# Patient Record
Sex: MALE | Race: WHITE | URBAN - METROPOLITAN AREA
[De-identification: names, ages, dates, MRNs, and addresses within clinical notes are randomized per-mention and may not be internally consistent; named-entity substitution may affect disease eponyms.]

---

## 2017-11-17 ENCOUNTER — APPOINTMENT (OUTPATIENT)
Dept: GENERAL RADIOLOGY | Facility: CLINIC | Age: 47
End: 2017-11-17
Attending: EMERGENCY MEDICINE
Payer: COMMERCIAL

## 2017-11-17 ENCOUNTER — HOSPITAL ENCOUNTER (EMERGENCY)
Facility: CLINIC | Age: 47
Discharge: HOME | End: 2017-11-17
Attending: EMERGENCY MEDICINE | Admitting: EMERGENCY MEDICINE
Payer: COMMERCIAL

## 2017-11-17 ENCOUNTER — OFFICE VISIT (OUTPATIENT)
Dept: URGENT CARE | Facility: URGENT CARE | Age: 47
End: 2017-11-17
Payer: COMMERCIAL

## 2017-11-17 VITALS
WEIGHT: 180 LBS | HEIGHT: 73 IN | SYSTOLIC BLOOD PRESSURE: 107 MMHG | HEART RATE: 56 BPM | TEMPERATURE: 98.7 F | DIASTOLIC BLOOD PRESSURE: 75 MMHG | RESPIRATION RATE: 16 BRPM | OXYGEN SATURATION: 98 % | BODY MASS INDEX: 23.86 KG/M2

## 2017-11-17 VITALS
OXYGEN SATURATION: 96 % | SYSTOLIC BLOOD PRESSURE: 111 MMHG | WEIGHT: 180.3 LBS | DIASTOLIC BLOOD PRESSURE: 74 MMHG | RESPIRATION RATE: 16 BRPM | TEMPERATURE: 97.5 F | HEART RATE: 64 BPM

## 2017-11-17 DIAGNOSIS — R07.89 ATYPICAL CHEST PAIN: ICD-10-CM

## 2017-11-17 DIAGNOSIS — R07.9 ACUTE CHEST PAIN: Primary | ICD-10-CM

## 2017-11-17 LAB — TROPONIN I SERPL-MCNC: <0.015 UG/L (ref 0–0.04)

## 2017-11-17 PROCEDURE — 84484 ASSAY OF TROPONIN QUANT: CPT | Performed by: EMERGENCY MEDICINE

## 2017-11-17 PROCEDURE — 99214 OFFICE O/P EST MOD 30 MIN: CPT | Mod: 25 | Performed by: PHYSICIAN ASSISTANT

## 2017-11-17 PROCEDURE — 93000 ELECTROCARDIOGRAM COMPLETE: CPT | Performed by: PHYSICIAN ASSISTANT

## 2017-11-17 PROCEDURE — 96372 THER/PROPH/DIAG INJ SC/IM: CPT | Performed by: PHYSICIAN ASSISTANT

## 2017-11-17 PROCEDURE — 71020 XR CHEST 2 VW: CPT

## 2017-11-17 PROCEDURE — 93005 ELECTROCARDIOGRAM TRACING: CPT

## 2017-11-17 PROCEDURE — 99285 EMERGENCY DEPT VISIT HI MDM: CPT | Mod: 25

## 2017-11-17 RX ORDER — KETOROLAC TROMETHAMINE 30 MG/ML
30 INJECTION, SOLUTION INTRAMUSCULAR; INTRAVENOUS ONCE
Qty: 1 ML | Refills: 0 | OUTPATIENT
Start: 2017-11-17 | End: 2017-11-17

## 2017-11-17 ASSESSMENT — ENCOUNTER SYMPTOMS
WEAKNESS: 0
MYALGIAS: 1
VOMITING: 0
FEVER: 0
NAUSEA: 0
DIAPHORESIS: 1
NUMBNESS: 0
SHORTNESS OF BREATH: 0
COUGH: 0

## 2017-11-17 NOTE — ED AVS SNAPSHOT
Emergency Department    [address] [facility] [address]    Phone:  [phone]    Fax:  [phone]                                       Taj Lundy   MRN: [id number]    Department:   Emergency Department   Date of Visit:  11/17/2017           Patient Information     Date Of Birth          1970        Your diagnoses for this visit were:     Atypical chest pain        You were seen by Mago Ring MD.      Follow-up Information     Follow up with Follow-up with your primary care doctor for further outpatient work-up.        Follow up with  Emergency Department.    Specialty:  EMERGENCY MEDICINE    Why:  As needed, If symptoms worsen    Contact information:    [address] [facility] [address] [phone]        Discharge Instructions          *CHEST PAIN, UNCERTAIN CAUSE    Based on your exam today, the exact cause of your chest pain is not certain. Your condition does not seem serious at this time, and your pain does not appear to be coming from your heart. However, sometimes the signs of a serious problem take more time to appear. Therefore, watch for the warning signs listed below.  HOME CARE:  1. Rest today and avoid strenuous activity.  2. Take any prescribed medicine as directed.  FOLLOW UP with your doctor in 1-3 days.   GET PROMPT MEDICAL ATTENTION if any of the following occur:    A change in the type of pain: if it feels different, becomes more severe, lasts longer, or begins to spread into your shoulder, arm, neck, jaw or back    Shortness of breath or increased pain with breathing    Weakness, dizziness, or fainting    Cough with blood or dark colored sputum (phlegm)    Fever over 101  F (38.3  C)    Swelling, pain or redness in one leg    5201-8263 [employer]. [address] [place] [address]. All rights reserved. This information is not intended as a substitute for professional medical care. Always follow  your healthcare professional's instructions.  This information has been modified by your health care provider with permission from the publisher.      24 Hour Appointment Hotline       To make an appointment at any [facility], call [phone] ([phone]). If you don't have a family doctor or clinic, we will help you find one. Midnight clinics are conveniently located to serve the needs of you and your family.             Review of your medicines      Our records show that you are taking the medicines listed below. If these are incorrect, please call your family doctor or clinic.        Dose / Directions Last dose taken    aspirin  MG EC tablet   Dose:  325 mg   Quantity:  1 tablet        Take 1 tablet (325 mg) by mouth once for 1 dose   Refills:  0        azithromycin 250 MG tablet   Commonly known as:  ZITHROMAX   Quantity:  6 tablet        2 tabs po qd day 1, then 1 tab po qd days 2-5   Refills:  0        benzonatate 200 MG capsule   Commonly known as:  TESSALON   Dose:  200 mg   Quantity:  21 capsule        Take 1 capsule (200 mg) by mouth 3 times daily as needed for cough   Refills:  0        cholecalciferol 5000 UNITS Caps capsule   Commonly known as:  vitamin D3   Dose:  1 capsule        Take 1 capsule by mouth daily   Refills:  0        cyclobenzaprine 10 MG tablet   Commonly known as:  FLEXERIL   Dose:  10 mg   Quantity:  30 tablet        Take 1 tablet (10 mg) by mouth 3 times daily as needed for muscle spasms   Refills:  0        ketorolac 30 MG/ML injection   Commonly known as:  TORADOL   Dose:  30 mg   Quantity:  1 mL        Inject 1 mL (30 mg) into the muscle once for 1 dose   Refills:  0        NO ACTIVE MEDICATIONS        Refills:  0        TYLENOL 325 MG tablet   Dose:  325-650 mg   Generic drug:  acetaminophen        Take 325-650 mg by mouth every 6 hours as needed for mild pain   Refills:  0                Procedures and tests performed during your visit     EKG 12 lead     Troponin I (now)    XR Chest 2 Views      Orders Needing Specimen Collection     None      Pending Results     Date and Time Order Name Status Description    11/17/2017 2044 XR Chest 2 Views Preliminary     11/17/2017 1841 EKG 12-LEAD COMPLETE W/READ - CLINICS Preliminary             Pending Culture Results     No orders found from 11/15/2017 to 11/18/2017.            Pending Results Instructions     If you had any lab results that were not finalized at the time of your Discharge, you can call the ED Lab Result RN at [phone]. You will be contacted by this team for any positive Lab results or changes in treatment. The nurses are available 7 days a week from 10A to 6:30P.  You can leave a message 24 hours per day and they will return your call.        Test Results From Your Hospital Stay        11/17/2017  9:12 PM      Component Results     Component Value Ref Range & Units Status    Troponin I ES <0.015 0.000 - 0.045 ug/L Final    The 99th percentile for upper reference range is 0.045 ug/L.  Troponin values   in the range of 0.045 - 0.120 ug/L may be associated with risks of adverse   clinical events.           11/17/2017  8:57 PM      Narrative     CHEST TWO VIEWS  11/17/2017  8:52 PM     COMPARISON: Two view chest x-ray 3/2/2013.    HISTORY: Chest pain, left-sided.    FINDINGS: The cardiac silhouette, pulmonary vasculature, lungs and  pleural spaces are within normal limits.        Impression     IMPRESSION: Clear lungs.                Clinical Quality Measure: Blood Pressure Screening     Your blood pressure was checked while you were in the emergency department today. The last reading we obtained was  BP: 127/76 . Please read the guidelines below about what these numbers mean and what you should do about them.  If your systolic blood pressure (the top number) is less than 120 and your diastolic blood pressure (the bottom number) is less than 80, then your blood pressure is normal. There is nothing more that  "you need to do about it.  If your systolic blood pressure (the top number) is 120-139 or your diastolic blood pressure (the bottom number) is 80-89, your blood pressure may be higher than it should be. You should have your blood pressure rechecked within a year by a primary care provider.  If your systolic blood pressure (the top number) is 140 or greater or your diastolic blood pressure (the bottom number) is 90 or greater, you may have high blood pressure. High blood pressure is treatable, but if left untreated over time it can put you at risk for heart attack, stroke, or kidney failure. You should have your blood pressure rechecked by a primary care provider within the next 4 weeks.  If your provider in the emergency department today gave you specific instructions to follow-up with your doctor or provider even sooner than that, you should follow that instruction and not wait for up to 4 weeks for your follow-up visit.        Thank you for choosing [facility]       Thank you for choosing [facility] for your care. Our goal is always to provide you with excellent care. Hearing back from our patients is one way we can continue to improve our services. Please take a few minutes to complete the written survey that you may receive in the mail after you visit with us. Thank you!        [employer] Information     [employer] lets you send messages to your doctor, view your test results, renew your prescriptions, schedule appointments and more. To sign up, go to [email or web address] . Click on \"Log in\" on the left side of the screen, which will take you to the Welcome page. Then click on \"Sign up Now\" on the right side of the page.     You will be asked to enter the access code listed below, as well as some personal information. Please follow the directions to create your username and password.     Your access code is: [email or web address]  Expires: 2/15/2018  8:13 PM     Your access code will  in 90 days. If you need help or a new " code, please call your [place] clinic or [phone].        Care EveryWhere ID     This is your Care EveryWhere ID. This could be used by other organizations to access your [facility] medical records  [id number]        Equal Access to Services     ALTA PELAYO : Trina Durbin, watanikada luqadaha, qaybta kaalmada rosie, joni cordero. So [facility] [phone].    ATENCIÓN: Si habla español, tiene a vogel disposición servicios gratuitos de asistencia lingüística. Llame al [phone].    We comply with applicable federal civil rights laws and [place] laws. We do not discriminate on the basis of race, color, national origin, age, disability, sex, sexual orientation, or gender identity.            After Visit Summary       This is your record. Keep this with you and show to your community pharmacist(s) and doctor(s) at your next visit.

## 2017-11-17 NOTE — ED AVS SNAPSHOT
Emergency Department    [address] [place] [address]    Phone:  [phone]    Fax:  [phone]                                       Taj Lundy   MRN: [id number]    Department:   Emergency Department   Date of Visit:  11/17/2017           After Visit Summary Signature Page     I have received my discharge instructions, and my questions have been answered. I have discussed any challenges I see with this plan with the nurse or doctor.    ..........................................................................................................................................  Patient/Patient Representative Signature      ..........................................................................................................................................  Patient Representative Print Name and Relationship to Patient    ..................................................               ................................................  Date                                            Time    ..........................................................................................................................................  Reviewed by Signature/Title    ...................................................              ..............................................  Date                                                            Time

## 2017-11-17 NOTE — MR AVS SNAPSHOT
"              After Visit Summary   2017    Taj Lundy    MRN: [id number]           Patient Information     Date Of Birth          1970        Visit Information        Provider Department      2017 6:20 PM Elmer Rawls PA-C [facility]        Today's Diagnoses     Acute chest pain    -  1       Follow-ups after your visit        Who to contact     If you have questions or need follow up information about today's clinic visit or your schedule please contact [facility] [place] [facility] directly at [phone].  Normal or non-critical lab and imaging results will be communicated to you by [employer], letter or phone within 4 business days after the clinic has received the results. If you do not hear from us within 7 days, please contact the clinic through [employer] or phone. If you have a critical or abnormal lab result, we will notify you by phone as soon as possible.  Submit refill requests through [employer] or call your pharmacy and they will forward the refill request to us. Please allow 3 business days for your refill to be completed.          Additional Information About Your Visit        MyChart Information     [employer] lets you send messages to your doctor, view your test results, renew your prescriptions, schedule appointments and more. To sign up, go to [email or web address] . Click on \"Log in\" on the left side of the screen, which will take you to the Welcome page. Then click on \"Sign up Now\" on the right side of the page.     You will be asked to enter the access code listed below, as well as some personal information. Please follow the directions to create your username and password.     Your access code is: [email or web address]  Expires: 2/15/2018  8:13 PM     Your access code will  in 90 days. If you need help or a new code, please call your [place] clinic or [phone].        Care EveryWhere ID     This is your Care EveryWhere ID. " This could be used by other organizations to access your [facility] medical records  [id number]        Your Vitals Were     Pulse Temperature Respirations Pulse Oximetry          64 97.5  F (36.4  C) (Oral) 16 96%         Blood Pressure from Last 3 Encounters:   11/17/17 111/74   11/17/17 127/76   05/31/15 110/60    Weight from Last 3 Encounters:   11/17/17 180 lb 4.8 oz (81.8 kg)   11/17/17 180 lb (81.6 kg)   05/31/15 171 lb 14.4 oz (78 kg)              We Performed the Following     EKG 12-lead complete w/read - Clinics     INJECTION INTRAMUSCULAR OR SUB-Q     KETOROLAC TROMETHAMINE 15MG          Today's Medication Changes          These changes are accurate as of: 11/17/17  8:13 PM.  If you have any questions, ask your nurse or doctor.               Start taking these medicines.        Dose/Directions    aspirin  MG EC tablet   Used for:  Acute chest pain   Started by:  Elmer Rawls PA-C        Dose:  325 mg   Take 1 tablet (325 mg) by mouth once for 1 dose   Quantity:  1 tablet   Refills:  0       ketorolac 30 MG/ML injection   Commonly known as:  TORADOL   Used for:  Acute chest pain   Started by:  Elmer Rawls PA-C        Dose:  30 mg   Inject 1 mL (30 mg) into the muscle once for 1 dose   Quantity:  1 mL   Refills:  0            Where to get your medicines      These medications were sent to [facility] [address] [address]     Phone:  [phone]     aspirin  MG EC tablet         Some of these will need a paper prescription and others can be bought over the counter.  Ask your nurse if you have questions.     You don't need a prescription for these medications     ketorolac 30 MG/ML injection                Primary Care Provider    Physician No Ref-Primary       NO REF-PRIMARY PHYSICIAN        Equal Access to Services     ALTA PELAYO : Trina Durbin, bethel golden, kayy owens  joni velezsamantha castroaan ah. So [facility] [phone].    ATENCIÓN: Si altala lela, tiene a vogel disposición servicios gratuitos de asistencia lingüística. Zayra al [phone].    We comply with applicable federal civil rights laws and [place] laws. We do not discriminate on the basis of race, color, national origin, age, disability, sex, sexual orientation, or gender identity.            Thank you!     Thank you for choosing [facility]  for your care. Our goal is always to provide you with excellent care. Hearing back from our patients is one way we can continue to improve our services. Please take a few minutes to complete the written survey that you may receive in the mail after your visit with us. Thank you!             Your Updated Medication List - Protect others around you: Learn how to safely use, store and throw away your medicines at www.disposemymeds.org.          This list is accurate as of: 11/17/17  8:13 PM.  Always use your most recent med list.                   Brand Name Dispense Instructions for use Diagnosis    aspirin  MG EC tablet     1 tablet    Take 1 tablet (325 mg) by mouth once for 1 dose    Acute chest pain       azithromycin 250 MG tablet    ZITHROMAX    6 tablet    2 tabs po qd day 1, then 1 tab po qd days 2-5    Productive cough, Purulent rhinitis, Chest congestion       benzonatate 200 MG capsule    TESSALON    21 capsule    Take 1 capsule (200 mg) by mouth 3 times daily as needed for cough    Coughing       cholecalciferol 5000 UNITS Caps capsule    vitamin D3     Take 1 capsule by mouth daily        cyclobenzaprine 10 MG tablet    FLEXERIL    30 tablet    Take 1 tablet (10 mg) by mouth 3 times daily as needed for muscle spasms    LBP (low back pain)       ketorolac 30 MG/ML injection    TORADOL    1 mL    Inject 1 mL (30 mg) into the muscle once for 1 dose    Acute chest pain       NO ACTIVE MEDICATIONS           TYLENOL  325 MG tablet   Generic drug:  acetaminophen      Take 325-650 mg by mouth every 6 hours as needed for mild pain

## 2017-11-18 LAB — INTERPRETATION ECG - MUSE: NORMAL

## 2017-11-18 NOTE — NURSING NOTE
Chief Complaint   Patient presents with     Arm Pain     Pain under lt arm pit x about 2 months. Pt states that he got accupuncture x 2 days. Pt complains of lt side chest pain and arm pain since then x 2 days        Initial /74  Pulse 64  Temp 97.5  F (36.4  C) (Oral)  Resp 16  Wt 180 lb 4.8 oz (81.8 kg)  SpO2 96% There is no height or weight on file to calculate BMI.  Medication Reconciliation: complete

## 2017-11-18 NOTE — ED PROVIDER NOTES
"  History     Chief Complaint:  Chest pain      HPI   Taj Lundy is a 47 year old male who presents with chest pain. The patient states he has been experiencing ongoing \"dull, cramping in the left armpit\" for the past few months which he has tried acupuncture to help with pain. This pain is radiating into his left shoulder and down his left arm today which prompted him to go to Urgent Care and he was sent here for further evaluation. Here, he notes that his pain improves if his rests his left arm against his body and worsens when he leaves the left arm hanging. In addition he also notes mild sweating and warmth today. Here, the complains of continued dull pain. However, he denies fever, cough, numbness, weakness, shortness of breath, nausea, vomiting, or any additional symptoms.      CARDIAC RISK FACTORS:  Sex:    Male  Tobacco:   Former smoker  Hypertension:   No  Hyperlipidemia:  No  Diabetes:   No  Family History:  No    Allergies:  Amoxicillin     Medications:    ketorolac (TORADOL) 30 MG/ML injection  aspirin  MG EC tablet  acetaminophen (TYLENOL) 325 MG tablet  cyclobenzaprine (FLEXERIL) 10 MG tablet  azithromycin (ZITHROMAX) 250 MG tablet  benzonatate (TESSALON) 200 MG capsule  cholecalciferol (VITAMIN D3) 5000 UNITS CAPS capsule    Past Medical History:    The patient does not have any past pertinent medical history.    Past Surgical History:    History reviewed. No pertinent surgical history.     Family History:    History reviewed. No pertinent family history.      Social History:  Smoking status: Former smoker  Alcohol use: Yes     Review of Systems   Constitutional: Positive for diaphoresis. Negative for fever.   Respiratory: Negative for cough and shortness of breath.    Cardiovascular: Positive for chest pain.   Gastrointestinal: Negative for nausea and vomiting.   Musculoskeletal: Positive for myalgias.   Neurological: Negative for weakness and numbness.   All other systems reviewed and are " "negative.    Physical Exam   First Vitals:  BP: 127/76  Pulse: 61  Temp: 98.7  F (37.1  C)  Resp: 16  Height: 185.4 cm (6' 1\")  Weight: 81.6 kg (180 lb)  SpO2: 99 %    Physical Exam  General/Appearance: appears stated age, well-groomed, appears comfortable  Eyes: EOMI, no scleral injection, no icterus  ENT: MMM  Neck: supple, nl ROM, no stiffness  Cardiovascular: RRR, nl S1S2, no m/r/g, 2+ pulses in all 4 extremities, cap refill <2sec  Respiratory: CTAB, good air movement throughout, no wheezes/rhonchi/rales, no increased WOB, no retractions  Back: no lesions, no reproducible pain with palp to shoulder/upper back, + muscle spasms appreciated in L lateral upper back  GI: abd soft, non-distended, nttp,  no HSM, no rebound, no guarding, nl BS  MSK: MOOENY, good tone, no bony abnormality, no reproducible ttp to LUE  Skin: warm and well-perfused, no rash, no edema, no ecchymosis, nl turgor  Neuro: GCS 15, alert and oriented, no gross focal neuro deficits  Psych: interacts appropriately  Heme: no petechia, no purpura, no active bleeding        Emergency Department Course   ECG (20:07:56):  Rate 60 bpm. DC interval 146. QRS duration 96. QT/QTc 388/388. P-R-T axes 81 116 59. Normal sinus rhythm. Right axis deviation. Pulmonary disease pattern. Abnormal ECG. Interpreted at 2035 by Mago Ring MD.     Imaging:  Radiographic findings were communicated with the patient who voiced understanding of the findings.    X-ray chest, 2 views:  Clear lungs  Result per radiology.      Laboratory:  2040 Troponin: <0.015    Emergency Department Course:  Past medical records, nursing notes, and vitals reviewed.  2029: I performed an exam of the patient and obtained history, as documented above.  IV started and labs were obtained.   The patient was sent for an X-ray while in the emergency department, findings above.    2131: I rechecked the patient. Findings and plan explained to the Patient. Patient discharged home with instructions " "regarding supportive care, medications, and reasons to return. The importance of close follow-up was reviewed.       Impression & Plan    Medical Decision Making:  This patient is a 47-year-old male with several months of intermittent \"cramping and spasmodic\" discomfort to his left axilla that over the past 1-2 days he began also experiencing in his proximal left upper extremity and just lateral to his left shoulder blade. It never appears to be reproducible. He denies any concerning symptoms with this such as pleuritic nature, exertional nature, shortness of breath, diaphoresis, vomiting. He has no early familial cardiac disease nor personal risk factors for cardiac disease. EKG and troponin were unremarkable. Given the longevity of this pain and the atypical nature I have very low suspicion that it represents ACS, PE, aortic dissection. Pulmonary exam on chest x-ray did not show any evidence of pneumothorax or other masses. I think this is reasonable to have further follow-up as an outpatient.    Diagnosis:    ICD-10-CM    1. Atypical chest pain R07.89        Disposition:  discharged to home    Tye Escoto  11/17/2017    EMERGENCY DEPARTMENT  I, Tye Escoto, am serving as a scribe at 8:29 PM on 11/17/2017 to document services personally performed by Mago Ring MD based on my observations and the provider's statements to me.       Mago Ring MD  11/17/17 2224    "

## 2017-11-18 NOTE — DISCHARGE INSTRUCTIONS
*CHEST PAIN, UNCERTAIN CAUSE    Based on your exam today, the exact cause of your chest pain is not certain. Your condition does not seem serious at this time, and your pain does not appear to be coming from your heart. However, sometimes the signs of a serious problem take more time to appear. Therefore, watch for the warning signs listed below.  HOME CARE:  1. Rest today and avoid strenuous activity.  2. Take any prescribed medicine as directed.  FOLLOW UP with your doctor in 1-3 days.   GET PROMPT MEDICAL ATTENTION if any of the following occur:    A change in the type of pain: if it feels different, becomes more severe, lasts longer, or begins to spread into your shoulder, arm, neck, jaw or back    Shortness of breath or increased pain with breathing    Weakness, dizziness, or fainting    Cough with blood or dark colored sputum (phlegm)    Fever over 101  F (38.3  C)    Swelling, pain or redness in one leg    2944-7063 [employer]. [address] [place] [address]. All rights reserved. This information is not intended as a substitute for professional medical care. Always follow your healthcare professional's instructions.  This information has been modified by your health care provider with permission from the publisher.

## 2017-11-18 NOTE — PROGRESS NOTES
SUBJECTIVE:    Taj Lundy is an otherwise healthy 47 year old male with limited medical history who presents to the office with the CC of chest pain.     Patient complains of left sided lateral chest pain - radiating to/from the back.  The pain is characterized as 8-9 out of 10, localized and intermittent stopping him in his tracks with each occurrance lasting up to 10 seconds.   He states that symptoms began 2 month(s) ago, gradual onset but over the last two days the pain has been worsening and occurring more frequently.   He believes this may be due to acupuncture treatment he received the day before.       Patient feels it is musculoskeletal in nature, but cannot pinpoint or recreate the pain.      Denies shortness of breath, dyspnea on exertion, or exertional pain.     No history of heart disease, hyperlipidemia, hypertension.  Patient does not have a regular doctor, and states that [facility] is the only healthcare he has seen in the last 10 years.       No past medical history on file.      Current Outpatient Prescriptions:      ketorolac (TORADOL) 30 MG/ML injection, Inject 1 mL (30 mg) into the muscle once for 1 dose, Disp: 1 mL, Rfl: 0     aspirin  MG EC tablet, Take 1 tablet (325 mg) by mouth once for 1 dose, Disp: 1 tablet, Rfl: 0     acetaminophen (TYLENOL) 325 MG tablet, Take 325-650 mg by mouth every 6 hours as needed for mild pain, Disp: , Rfl:      cyclobenzaprine (FLEXERIL) 10 MG tablet, Take 1 tablet (10 mg) by mouth 3 times daily as needed for muscle spasms (Patient not taking: Reported on 11/17/2017), Disp: 30 tablet, Rfl: 0     azithromycin (ZITHROMAX) 250 MG tablet, 2 tabs po qd day 1, then 1 tab po qd days 2-5 (Patient not taking: Reported on 11/17/2017), Disp: 6 tablet, Rfl: 0     benzonatate (TESSALON) 200 MG capsule, Take 1 capsule (200 mg) by mouth 3 times daily as needed for cough (Patient not taking: Reported on 11/17/2017), Disp: 21 capsule, Rfl: 0      cholecalciferol (VITAMIN D3) 5000 UNITS CAPS capsule, Take 1 capsule by mouth daily, Disp: , Rfl:      NO ACTIVE MEDICATIONS, , Disp: , Rfl:     Social History   Substance Use Topics     Smoking status: Former Smoker     Types: Cigarettes     Quit date: 1/8/2003     Smokeless tobacco: Never Used     Alcohol use Yes       ROS:Review of systems negative except as stated above.    EXAM:  /74  Pulse 64  Temp 97.5  F (36.4  C) (Oral)  Resp 16  Wt 180 lb 4.8 oz (81.8 kg)  SpO2 96%   GENERAL APPEARANCE: healthy, drowsy, marked distress from episodes of pain.   RESP: lungs clear to auscultation  CV: regular rates and rhythm  MS: Unable to reproduce pain with positions, palpation; non-tender  NEURO: Normal strength and tone, sensory exam grossly normal,  normal speech and mentation  SKIN: minimal diaphoresis    Office EKG demonstrates:  sinus bradycardia, rightward axis    IMPRESSION  (R07.9) Acute chest pain  (primary encounter diagnosis)  Comment:  Atypical chest pain.  Patient given Aspirin and Ketorolac in clinic without improvement. Physical exam and ekg inconclusive.  Concern for cardiac etiology cannot be ruled out.      Plan: EKG 12-lead complete w/read - Clinics,         ketorolac (TORADOL) 30 MG/ML injection, aspirin         MG EC tablet, KETOROLAC TROMETHAMINE         15MG, INJECTION INTRAMUSCULAR OR SUB-Q,     Patient directed to [facility] ER by ambulance, opted to have his brother drive him to the ER.

## 2017-12-23 ENCOUNTER — HOSPITAL ENCOUNTER (EMERGENCY)
Facility: HOSPITAL | Age: 47
Discharge: HOME | End: 2017-12-23
Payer: COMMERCIAL

## 2017-12-23 ENCOUNTER — APPOINTMENT (OUTPATIENT)
Dept: RADIOLOGY | Facility: HOSPITAL | Age: 47
End: 2017-12-23
Payer: COMMERCIAL

## 2017-12-23 VITALS
WEIGHT: 188.93 LBS | HEIGHT: 73 IN | HEART RATE: 58 BPM | OXYGEN SATURATION: 96 % | BODY MASS INDEX: 25.04 KG/M2 | DIASTOLIC BLOOD PRESSURE: 66 MMHG | RESPIRATION RATE: 16 BRPM | TEMPERATURE: 97.3 F | SYSTOLIC BLOOD PRESSURE: 106 MMHG

## 2017-12-23 DIAGNOSIS — M54.2 CERVICALGIA: Primary | ICD-10-CM

## 2017-12-23 PROCEDURE — 72040 X-RAY EXAM NECK SPINE 2-3 VW: CPT

## 2017-12-23 PROCEDURE — 99283 EMERGENCY DEPT VISIT LOW MDM: CPT

## 2017-12-23 PROCEDURE — 2500000200 HC RX 250 WO HCPCS: Performed by: PHYSICIAN ASSISTANT

## 2017-12-23 PROCEDURE — 6370000100 HC RX 637 (ALT 250 FOR IP): Performed by: PHYSICIAN ASSISTANT

## 2017-12-23 PROCEDURE — 72050 X-RAY EXAM NECK SPINE 4/5VWS: CPT

## 2017-12-23 RX ORDER — HYDROCODONE BITARTRATE AND ACETAMINOPHEN 5; 325 MG/1; MG/1
1 TABLET ORAL ONCE
Status: COMPLETED | OUTPATIENT
Start: 2017-12-23 | End: 2017-12-23

## 2017-12-23 RX ORDER — ONDANSETRON 4 MG/1
4 TABLET, ORALLY DISINTEGRATING ORAL ONCE
Status: COMPLETED | OUTPATIENT
Start: 2017-12-23 | End: 2017-12-23

## 2017-12-23 RX ORDER — HYDROCODONE BITARTRATE AND ACETAMINOPHEN 5; 325 MG/1; MG/1
1 TABLET ORAL EVERY 6 HOURS PRN
Qty: 12 TABLET | Refills: 0 | Status: SHIPPED | OUTPATIENT
Start: 2017-12-23 | End: 2017-12-26

## 2017-12-23 RX ADMIN — HYDROCODONE BITARTRATE AND ACETAMINOPHEN 1 TABLET: 5; 325 TABLET ORAL at 12:51

## 2017-12-23 RX ADMIN — ONDANSETRON 4 MG: 4 TABLET, ORALLY DISINTEGRATING ORAL at 12:52

## 2017-12-23 NOTE — ED PROVIDER NOTES
HPI:  Chief Complaint   Patient presents with   • Neck Pain     PT REPORTS PINCHED NERVE IN NECK AND UNABLE TO GET IN WITH DOCTOR BUT CANT MANAGE PAIN. PT SEEN AT URGENT CARE TODAY AND GIVEN 60MG TORADOL, BUT NO IMPROVEMENT       Renaldo is a 47-year-old male who presents to be evaluated for flareup of chronic back, muscle, joint pain today.  He reports this is something that is been going on for months.  He was sent here from the recommendation of urgent care today after they had seen him and give him a dose of Toradol.  Additionally, he has a prescription for cyclobenzaprine, tramadol, and ibuprofen that he has been taking.  He reports nothing helps that it makes the pain better.  He reports today the pain is more severe than he feels it has been.  He denies any chest pain or shortness of breath.  No headaches or vision change.  No weakness.  No swelling or edema.  Denies any fever or chills.  Denies any unintentional weight changes over the past several months.  Denies any rashes or lesions.  Denies any medication changes other than those previously mentioned.  He reports he has had imaging, to include an MRI done in the past.  He was directed by another ER to follow-up with primary care which he did not do.            HISTORY:  Past Medical History:   Diagnosis Date   • Chronic pain     NECK       Past Surgical History:   Procedure Laterality Date   • APPENDECTOMY         History reviewed. No pertinent family history.    Social History   Substance Use Topics   • Smoking status: Never Smoker   • Smokeless tobacco: Never Used   • Alcohol use Yes      Comment: 5X WEEKLY       ROS:  Review of Systems   All other systems reviewed and are negative.      PE:  ED Triage Vitals   Temp Heart Rate Resp BP SpO2   12/23/17 1148 12/23/17 1148 12/23/17 1148 12/23/17 1148 12/23/17 1148   36.3 °C (97.3 °F) 65 16 97/69 95 %      Temp Source Heart Rate Source Patient Position BP Location FiO2 (%)   12/23/17 1148 -- 12/23/17 1325  -- --   Oral  Sitting         Physical Exam   Constitutional: He appears well-developed and well-nourished. No distress.   HENT:   Head: Normocephalic and atraumatic.   Eyes: Conjunctivae are normal.   Neck: Neck supple.   Cardiovascular: Normal rate and regular rhythm.    No murmur heard.  Pulmonary/Chest: Effort normal and breath sounds normal. No respiratory distress.   Abdominal: Soft. There is no tenderness.   Musculoskeletal: He exhibits no edema.        Cervical back: He exhibits pain. He exhibits normal range of motion, no tenderness, no bony tenderness, no swelling, no edema and no spasm.   Neurological: He is alert. He has normal strength. He displays normal reflexes. No cranial nerve deficit or sensory deficit. GCS eye subscore is 4. GCS verbal subscore is 5. GCS motor subscore is 6.   Reflex Scores:       Patellar reflexes are 2+ on the right side and 2+ on the left side.  Pain does seem out of proportion to physical exam findings.   Skin: Skin is warm and dry. He is not diaphoretic.   Psychiatric: He has a normal mood and affect. His speech is normal and behavior is normal. Thought content normal.   Nursing note and vitals reviewed.      ED LABS:  Labs Reviewed - No data to display      ED IMAGES:  X-ray cervical spine complete 4 or 5 views   Final Result   Degenerative changes at C5-6 with disc space narrowing and endplate degenerative changes and reversal of usual lordotic curvature.          ED PROCEDURES:  Procedures    ED COURSE:  ED Course        MDM:  MDM  Number of Diagnoses or Management Options  Cervicalgia:   Diagnosis management comments: Discussed x-ray findings with patient.  Overall his physical exam is reassuring.  His subjective complaint of pain being consistent with a chronic issue does need additional workup I feel.  I encouraged the patient that this workup to follow with family medicine/primary care as it is not an acute/emergency issue.  I feel he would best be served by a primary  care provider who can review his medical records and proceed with a systematic workup with close follow-up and continuity of care.  I discussed a safe and effective use of the prescribed medications and encourage patient to continue to rest and avoid aggravating activities.  Discussed safe and effective over-the-counter anti-inflammatory and analgesic medications.  Recommend he return right away for any new or worsening symptoms or if no improvement.  Patient verbalizes understanding and agreement to the plan of care.       Amount and/or Complexity of Data Reviewed  Tests in the radiology section of CPT®: reviewed and ordered  Tests in the medicine section of CPT®: reviewed and ordered        Final diagnoses:   [M54.2] Cervicalgia        TERESA Olivas  12/23/17 5313

## 2017-12-24 ENCOUNTER — HOSPITAL ENCOUNTER (EMERGENCY)
Facility: HOSPITAL | Age: 47
Discharge: HOME | End: 2017-12-24
Payer: COMMERCIAL

## 2017-12-24 VITALS
RESPIRATION RATE: 16 BRPM | WEIGHT: 185.19 LBS | DIASTOLIC BLOOD PRESSURE: 62 MMHG | HEART RATE: 113 BPM | BODY MASS INDEX: 24.43 KG/M2 | SYSTOLIC BLOOD PRESSURE: 113 MMHG | OXYGEN SATURATION: 97 % | TEMPERATURE: 97.5 F

## 2017-12-24 DIAGNOSIS — R20.3 HYPERESTHESIA: ICD-10-CM

## 2017-12-24 DIAGNOSIS — M62.838 MUSCLE SPASM: Primary | ICD-10-CM

## 2017-12-24 PROCEDURE — 6370000100 HC RX 637 (ALT 250 FOR IP): Performed by: PHYSICIAN ASSISTANT

## 2017-12-24 PROCEDURE — 99281 EMR DPT VST MAYX REQ PHY/QHP: CPT

## 2017-12-24 RX ORDER — CYCLOBENZAPRINE HCL 10 MG
10 TABLET ORAL ONCE
Status: COMPLETED | OUTPATIENT
Start: 2017-12-24 | End: 2017-12-24

## 2017-12-24 RX ORDER — TRAMADOL HYDROCHLORIDE 50 MG/1
50 TABLET ORAL EVERY 6 HOURS PRN
COMMUNITY
End: 2018-01-03 | Stop reason: HOSPADM

## 2017-12-24 RX ORDER — CYCLOBENZAPRINE HCL 10 MG
10 TABLET ORAL 3 TIMES DAILY PRN
Qty: 30 TABLET | Refills: 0 | Status: SHIPPED | OUTPATIENT
Start: 2017-12-24 | End: 2018-01-03 | Stop reason: HOSPADM

## 2017-12-24 RX ADMIN — CYCLOBENZAPRINE HYDROCHLORIDE 10 MG: 10 TABLET, FILM COATED ORAL at 21:15

## 2017-12-24 ASSESSMENT — ENCOUNTER SYMPTOMS
HEADACHES: 0
ARTHRALGIAS: 0
COUGH: 0
ABDOMINAL PAIN: 0
SHORTNESS OF BREATH: 0
SORE THROAT: 0
COLOR CHANGE: 0
BACK PAIN: 0
FEVER: 0
CHILLS: 0
PALPITATIONS: 0
VOMITING: 0

## 2017-12-25 ENCOUNTER — HOSPITAL ENCOUNTER (EMERGENCY)
Facility: HOSPITAL | Age: 47
Discharge: HOME | End: 2017-12-25
Attending: EMERGENCY MEDICINE
Payer: COMMERCIAL

## 2017-12-25 VITALS
OXYGEN SATURATION: 95 % | WEIGHT: 185.19 LBS | HEIGHT: 73 IN | BODY MASS INDEX: 24.54 KG/M2 | SYSTOLIC BLOOD PRESSURE: 156 MMHG | HEART RATE: 80 BPM | RESPIRATION RATE: 20 BRPM | DIASTOLIC BLOOD PRESSURE: 65 MMHG

## 2017-12-25 DIAGNOSIS — M54.10 RADICULOPATHY: Primary | ICD-10-CM

## 2017-12-25 PROCEDURE — 6360000200 HC RX 636 W HCPCS (ALT 250 FOR IP): Performed by: EMERGENCY MEDICINE

## 2017-12-25 PROCEDURE — 96372 THER/PROPH/DIAG INJ SC/IM: CPT

## 2017-12-25 PROCEDURE — 99282 EMERGENCY DEPT VISIT SF MDM: CPT | Performed by: EMERGENCY MEDICINE

## 2017-12-25 PROCEDURE — 99282 EMERGENCY DEPT VISIT SF MDM: CPT

## 2017-12-25 PROCEDURE — 6370000100 HC RX 637 (ALT 250 FOR IP): Performed by: EMERGENCY MEDICINE

## 2017-12-25 RX ORDER — PREDNISONE 20 MG/1
60 TABLET ORAL DAILY
Status: DISCONTINUED | OUTPATIENT
Start: 2017-12-25 | End: 2017-12-25 | Stop reason: HOSPADM

## 2017-12-25 RX ORDER — DIAZEPAM 5 MG/1
5 TABLET ORAL ONCE
Status: COMPLETED | OUTPATIENT
Start: 2017-12-25 | End: 2017-12-25

## 2017-12-25 RX ORDER — DIAZEPAM 10 MG/2ML
5 INJECTION INTRAMUSCULAR ONCE
Status: DISCONTINUED | OUTPATIENT
Start: 2017-12-25 | End: 2017-12-25

## 2017-12-25 RX ORDER — DIAZEPAM 5 MG/1
5 TABLET ORAL EVERY 6 HOURS PRN
Qty: 30 TABLET | Refills: 0 | Status: SHIPPED | OUTPATIENT
Start: 2017-12-25 | End: 2018-01-03 | Stop reason: HOSPADM

## 2017-12-25 RX ORDER — OXYCODONE AND ACETAMINOPHEN 5; 325 MG/1; MG/1
1 TABLET ORAL EVERY 4 HOURS PRN
Qty: 30 TABLET | Refills: 0 | Status: ON HOLD | OUTPATIENT
Start: 2017-12-25 | End: 2018-01-03

## 2017-12-25 RX ORDER — PREDNISONE 20 MG/1
40 TABLET ORAL DAILY
Qty: 8 TABLET | Refills: 0 | Status: SHIPPED | OUTPATIENT
Start: 2017-12-25 | End: 2017-12-29

## 2017-12-25 RX ORDER — PROMETHAZINE HYDROCHLORIDE 25 MG/ML
50 INJECTION, SOLUTION INTRAMUSCULAR; INTRAVENOUS ONCE
Status: COMPLETED | OUTPATIENT
Start: 2017-12-25 | End: 2017-12-25

## 2017-12-25 RX ADMIN — MORPHINE SULFATE 10 MG: 10 INJECTION, SOLUTION INTRAMUSCULAR; INTRAVENOUS at 12:12

## 2017-12-25 RX ADMIN — PROMETHAZINE HYDROCHLORIDE 50 MG: 25 INJECTION INTRAMUSCULAR; INTRAVENOUS at 12:11

## 2017-12-25 RX ADMIN — PREDNISONE 60 MG: 20 TABLET ORAL at 13:03

## 2017-12-25 RX ADMIN — DIAZEPAM 5 MG: 5 TABLET ORAL at 12:45

## 2017-12-25 ASSESSMENT — ENCOUNTER SYMPTOMS
NECK PAIN: 1
NECK STIFFNESS: 1

## 2017-12-25 NOTE — DISCHARGE INSTRUCTIONS
Follow-up with your primary care in 1 week.  May need referral to physical therapy for desensitization of hyperesthetic area of underarm and lateral thorax.  Return to the emergency department for worsening or other concerning symptoms.

## 2017-12-25 NOTE — ED PROVIDER NOTES
HPI:  Chief Complaint   Patient presents with   • Neck Pain     PINCHED NERVE IN NECK  AND SHOULDER BLADE, STARTED LAST SPRING, WENT AWAY BUT HAS CAME BACK       HPI  This is a 47 y.o.male who presents to the emergency department with complaints of severe cramping pain in his neck  And left shoulder/upper arm developed earlier this week.. Patient has increased pain with palpation. Patient  reports blurry vision as well.Patient has been to the ED x2 earlier this week and was prescribed muscle relaxants and pain medication. Patient states he has a history of pinched nerve at C6. Patient has been taking muscle relaxants. He took Vicodin with moderate relief of pain. Patient states that he had MRI imaging of C-spine earlier this week.       HISTORY:  Past Medical History:   Diagnosis Date   • Chronic pain     NECK       Past Surgical History:   Procedure Laterality Date   • APPENDECTOMY         History reviewed. No pertinent family history.    Social History   Substance Use Topics   • Smoking status: Never Smoker   • Smokeless tobacco: Never Used   • Alcohol use Yes      Comment: 5X WEEKLY       ROS:  Review of Systems  Constitutional: negative for fever, negative for rash,  Eyes: positive for blurred vision  ENT: negative for sore throat  Cardiovascular: negative for chest pain  Respiratory: negative for shortness of breath  GI: negative for abdominal pain, negative for vomiting.  : negative for frequent urination  Musculoskeletal: positive for joint pain, positive for left arm pain, positive for neck pain,   Neuro: negative for headache  Rheumatologic: negative for joint pain  Endocrine: negative for polyuria     PE:  ED Triage Vitals   Temp Heart Rate Resp BP SpO2   -- 12/25/17 1304 12/25/17 1112 12/25/17 1304 12/25/17 1304    80 20 156/65 95 %      Temp Source Heart Rate Source Patient Position BP Location FiO2 (%)   12/25/17 1112 -- 12/25/17 1304 -- --   Oral  Sitting         Physical Exam  Nursing note and  vitals reviewed.  Constitutional: moderate  distress.   HENT: conjunctiva is normal and non-injected. Mucous membranes moist.   Eyes: Pupils are equal, round, and reactive to light.   Neck: trachea midline. Decreased range of motion,   Cardiovascular: Regular rate and rhythm. Pulses palpable all four extremities.   Pulmonary/Chest: No respiratory distress.  Clear to auscultation bilaterally.  Abdominal: Non-tender. Non-distended.  Musculoskeletal: No edema. extremely tender to light palpation across left latissimus dorsi   Neurological: Alert. No gross weakness.   Skin: Skin is warm and dry.    Psychiatric: Normal affect.         ED LABS:  Labs Reviewed - No data to display      ED IMAGES:  No orders to display       ED PROCEDURES:  Procedures    ED COURSE:  ED Course        MDM:  MDM  This is a 47 y.o.male who presents to the emergency department with 4 day history of severe neck and right shoulder pain. On examination patient is exquisitely tender to palpation of his right shoulder. Patient treated with Morphine, Phenergan and Valium. Old records were reviewed. MRI taken 5 days ago that shows left preforaminal and foraminal uncovertebral hypertrophy effaces the left C7 nerve root at C6-7. Kyphotic spine curvature due to chronic muscle tension.     8:40 AM On reexamination patient has minimal improvement of pain. He will be given dose of prednisone in the ED. Patient was discharged home with prescription 4 day prescription of Prednisone as well as prescriptions for oral Valium and Percocet.       Final diagnoses:   [M54.10] Radiculopathy   , Cervical     Reji Wiseman MD  12/30/17 0805

## 2017-12-25 NOTE — ED PROVIDER NOTES
Neck Pain (PATIENT ORIGINALLY STATES THAT HE HAS NECK PAIN AND ARM PAIN. PATIENT WALKING INTO TRIAGE CANNOT MOVE HIS LEFT ARM AND HOLDS IT AT AN ANGLE. WHILE SITTING IN TRIAGE TALKING TO RN HE RELAXES IT DOWN AND IS ABLE TO MOVE IT. )        HPI:    Patient is a 47 y.o. male who presents with concern for left side pain and arm pain, worsening over the last 2 weeks.  Patient denies accident or injury.  Patient denies bowel or bladder incontinence.  Patient states he feels weak in the left arm due to pain.  Patient states he has been seen by a PA and is scheduled for an epidural steroid injection in his neck on the 26th.  Patient states he has seen a chiropractor for this as well.      Past Medical History:   Diagnosis Date   • Chronic pain     NECK       Past Surgical History:   Procedure Laterality Date   • APPENDECTOMY         Social History     Social History   • Marital status: Legally      Spouse name: N/A   • Number of children: N/A   • Years of education: N/A     Occupational History   • Not on file.     Social History Main Topics   • Smoking status: Never Smoker   • Smokeless tobacco: Never Used   • Alcohol use Yes      Comment: 5X WEEKLY   • Drug use: No   • Sexual activity: Not on file     Other Topics Concern   • Not on file     Social History Narrative   • No narrative on file       History reviewed. No pertinent family history.    No Known Allergies      Current Outpatient Prescriptions:   •  DICLOFENAC SODIUM ORAL, Take by mouth., Disp: , Rfl:   •  traMADol (ULTRAM 50) 50 mg tablet, Take 50 mg by mouth every 6 (six) hours as needed for pain scale 4-7/10, 4-5/8., Disp: , Rfl:   •  cyclobenzaprine (FLEXERIL) 10 mg tablet, Take 1 tablet (10 mg total) by mouth 3 (three) times a day as needed for muscle spasms for up to 10 days., Disp: 30 tablet, Rfl: 0  •  HYDROcodone-acetaminophen (NORCO) 5-325 mg per tablet, Take 1 tablet by mouth every 6 (six) hours as needed for pain scale 8-10/10, 6-8/8 for up  to 3 days., Disp: 12 tablet, Rfl: 0    Review of Systems   Constitutional: Negative for chills and fever.   HENT: Negative for ear pain and sore throat.    Respiratory: Negative for cough and shortness of breath.    Cardiovascular: Negative for chest pain and palpitations.   Gastrointestinal: Negative for abdominal pain and vomiting.   Musculoskeletal: Positive for neck pain and neck stiffness (States slept wrong last night). Negative for arthralgias and back pain.        Hypersensitivity left lateral trunk, armpit to distal lateral thorax   Skin: Negative for color change and rash.   Neurological: Negative for headaches.   All other systems reviewed and are negative.      ED Triage Vitals [12/24/17 2008]   Temp Heart Rate Resp BP SpO2   36.4 °C (97.5 °F) 113 16 113/62 97 %      Temp Source Heart Rate Source Patient Position BP Location FiO2 (%)   Oral -- -- -- --         Physical Exam:  Nursing note and vitals reviewed.  Constitutional: appears well-developed.   Head: Normocephalic and atraumatic.   Eyes: Pupils are equal, round, and reactive to light.   Neck: Supple, no lymphadenopathy  Cardiovascular: Regular rate and rhythm with no murmur, rub, or gallop.  Normal pulses.  Pulmonary/Chest: No respiratory distress.  Clear to auscultation bilaterally.  Abdominal: Soft and nontender.    Musculoskeletal: No edema, tenderness in nondermatomal patterns down arm and lateral trunk on the left.  Neurological: Alert.   Skin: Skin is warm and dry. No rash noted.   Hypersensitivity to left lateral chest from armpit to distal lateral thorax to light touch.  Psychiatric: Tearful        Labs:  Labs Reviewed - No data to display      Imaging:  No orders to display             MDM:    He is hemodynamically stable.  Patient presented with concern for pain and hyperesthesia down left arm and left lateral trunk.  Patient without dermatomal pattern of pain noted.  Tenderness on palpation of lateral trunk.  Patient provided with  muscle spasm medication at this time to follow-up as scheduled for epidural steroid injection is planned.  Patient to return for worsening or other concerning symptoms.      Given   Medications   cyclobenzaprine (FLEXERIL) tablet 10 mg (10 mg oral Given 12/24/17 2115)       ED Course          Procedures        Clinical Impression:  Final diagnoses:   [R20.3] Hyperesthesia   [M62.838] Muscle spasm           A voice recognition program was used to aid in documentation of this record.  Sometimes words are not printed exactly as they were spoken.  While efforts were made to carefully edit and correct any inaccuracies, some areas may be present; please take these into context.  Please contact the provider if areas are identified.           TERESA Bach  12/25/17 7011

## 2017-12-31 ENCOUNTER — HOSPITAL ENCOUNTER (INPATIENT)
Facility: HOSPITAL | Age: 47
LOS: 1 days | Discharge: HOME | DRG: 552 | End: 2018-01-03
Attending: EMERGENCY MEDICINE | Admitting: FAMILY MEDICINE
Payer: COMMERCIAL

## 2017-12-31 DIAGNOSIS — M50.10 CERVICAL RADICULOPATHY DUE TO INTERVERTEBRAL DISC DISORDER: Primary | ICD-10-CM

## 2017-12-31 DIAGNOSIS — M54.10 RADICULOPATHY: ICD-10-CM

## 2017-12-31 DIAGNOSIS — M54.10 RADICULOPATHY AFFECTING UPPER EXTREMITY: ICD-10-CM

## 2017-12-31 DIAGNOSIS — M62.838 CERVICAL PARASPINAL MUSCLE SPASM: ICD-10-CM

## 2017-12-31 LAB
ALBUMIN SERPL-MCNC: 3.5 G/DL (ref 3.5–5.3)
ALP SERPL-CCNC: 43 U/L (ref 45–115)
ALT SERPL-CCNC: 61 U/L (ref 0–52)
AMPHET UR QL SCN: ABNORMAL
ANION GAP SERPL CALC-SCNC: 6 MMOL/L (ref 3–11)
AST SERPL-CCNC: 31 U/L (ref 0–39)
BARBITURATES UR QL SCN: ABNORMAL
BENZODIAZ UR QL SCN: POSITIVE
BILIRUB SERPL-MCNC: 0.45 MG/DL (ref 0–1.4)
BUN SERPL-MCNC: 16 MG/DL (ref 7–25)
CALCIUM ALBUM COR SERPL-MCNC: 9 MG/DL (ref 8.5–10.1)
CALCIUM SERPL-MCNC: 8.6 MG/DL (ref 8.6–10.3)
CANNABINOIDS UR QL SCN: POSITIVE
CHLORIDE SERPL-SCNC: 103 MMOL/L (ref 98–107)
CO2 SERPL-SCNC: 29 MMOL/L (ref 21–32)
COCAINE UR QL SCN: ABNORMAL
CREAT SERPL-MCNC: 0.8 MG/DL (ref 0.8–1.3)
ETHANOL SERPL-MCNC: <10 MG/DL (ref 0–10)
GFR SERPL CREATININE-BSD FRML MDRD: 103 ML/MIN/1.73M*2
GLUCOSE SERPL-MCNC: 82 MG/DL (ref 70–105)
MAGNESIUM SERPL-MCNC: 2.1 MG/DL (ref 1.8–2.4)
METHADONE UR QL SCN: ABNORMAL
METHAMPHET UR QL SCN: ABNORMAL
OPIATES UR QL SCN: POSITIVE
OXYCODONE UR QL SCN: POSITIVE
PCP UR QL SCN: ABNORMAL
PHOSPHATE SERPL-MCNC: 3.6 MG/DL (ref 2.5–4.9)
POTASSIUM SERPL-SCNC: 3.7 MMOL/L (ref 3.5–5.1)
PROPOXYPH UR QL SCN: ABNORMAL
PROT SERPL-MCNC: 5.9 G/DL (ref 6–8.3)
SODIUM SERPL-SCNC: 138 MMOL/L (ref 135–145)
TRICYCLICS UR QL SCN: ABNORMAL

## 2017-12-31 PROCEDURE — 36415 COLL VENOUS BLD VENIPUNCTURE: CPT | Performed by: STUDENT IN AN ORGANIZED HEALTH CARE EDUCATION/TRAINING PROGRAM

## 2017-12-31 PROCEDURE — 6370000100 HC RX 637 (ALT 250 FOR IP): Performed by: EMERGENCY MEDICINE

## 2017-12-31 PROCEDURE — 2500000200 HC RX 250 WO HCPCS: Performed by: STUDENT IN AN ORGANIZED HEALTH CARE EDUCATION/TRAINING PROGRAM

## 2017-12-31 PROCEDURE — G0378 HOSPITAL OBSERVATION PER HR: HCPCS

## 2017-12-31 PROCEDURE — 99285 EMERGENCY DEPT VISIT HI MDM: CPT | Performed by: EMERGENCY MEDICINE

## 2017-12-31 PROCEDURE — (BLANK) HC FACILITY CLINICAL SERVICE ED INPT

## 2017-12-31 PROCEDURE — 3E023BZ INTRODUCTION OF ANESTHETIC AGENT INTO MUSCLE, PERCUTANEOUS APPROACH: ICD-10-PCS | Performed by: EMERGENCY MEDICINE

## 2017-12-31 PROCEDURE — 6370000100 HC RX 637 (ALT 250 FOR IP): Performed by: STUDENT IN AN ORGANIZED HEALTH CARE EDUCATION/TRAINING PROGRAM

## 2017-12-31 PROCEDURE — 80320 DRUG SCREEN QUANTALCOHOLS: CPT | Performed by: STUDENT IN AN ORGANIZED HEALTH CARE EDUCATION/TRAINING PROGRAM

## 2017-12-31 PROCEDURE — 80306 DRUG TEST PRSMV INSTRMNT: CPT | Performed by: STUDENT IN AN ORGANIZED HEALTH CARE EDUCATION/TRAINING PROGRAM

## 2017-12-31 PROCEDURE — 2590000100 HC RX 259: Performed by: STUDENT IN AN ORGANIZED HEALTH CARE EDUCATION/TRAINING PROGRAM

## 2017-12-31 PROCEDURE — 84100 ASSAY OF PHOSPHORUS: CPT | Performed by: STUDENT IN AN ORGANIZED HEALTH CARE EDUCATION/TRAINING PROGRAM

## 2017-12-31 PROCEDURE — 99219 *NO LONGER ACTIVE 2023 DO NOT USE* PR INITIAL OBSERVATION CARE/DAY 50 MINUTES: CPT | Mod: GC | Performed by: STUDENT IN AN ORGANIZED HEALTH CARE EDUCATION/TRAINING PROGRAM

## 2017-12-31 PROCEDURE — 83735 ASSAY OF MAGNESIUM: CPT | Performed by: STUDENT IN AN ORGANIZED HEALTH CARE EDUCATION/TRAINING PROGRAM

## 2017-12-31 PROCEDURE — 6360000200 HC RX 636 W HCPCS (ALT 250 FOR IP): Performed by: STUDENT IN AN ORGANIZED HEALTH CARE EDUCATION/TRAINING PROGRAM

## 2017-12-31 PROCEDURE — 80053 COMPREHEN METABOLIC PANEL: CPT | Performed by: STUDENT IN AN ORGANIZED HEALTH CARE EDUCATION/TRAINING PROGRAM

## 2017-12-31 PROCEDURE — 6360000200 HC RX 636 W HCPCS (ALT 250 FOR IP): Performed by: EMERGENCY MEDICINE

## 2017-12-31 RX ORDER — LIDOCAINE 560 MG/1
1 PATCH PERCUTANEOUS; TOPICAL; TRANSDERMAL DAILY
Status: DISCONTINUED | OUTPATIENT
Start: 2017-12-31 | End: 2017-12-31

## 2017-12-31 RX ORDER — ENOXAPARIN SODIUM 100 MG/ML
40 INJECTION SUBCUTANEOUS
Status: DISCONTINUED | OUTPATIENT
Start: 2017-12-31 | End: 2018-01-03 | Stop reason: HOSPADM

## 2017-12-31 RX ORDER — ADHESIVE BANDAGE
30 BANDAGE TOPICAL DAILY PRN
Status: DISCONTINUED | OUTPATIENT
Start: 2017-12-31 | End: 2018-01-03 | Stop reason: HOSPADM

## 2017-12-31 RX ORDER — ONDANSETRON 4 MG/1
4 TABLET, FILM COATED ORAL EVERY 6 HOURS PRN
Status: DISCONTINUED | OUTPATIENT
Start: 2017-12-31 | End: 2018-01-03 | Stop reason: HOSPADM

## 2017-12-31 RX ORDER — SODIUM CHLORIDE 9 MG/ML
10 INJECTION, SOLUTION INTRAVENOUS CONTINUOUS
Status: DISCONTINUED | OUTPATIENT
Start: 2017-12-31 | End: 2018-01-03 | Stop reason: HOSPADM

## 2017-12-31 RX ORDER — DOCUSATE SODIUM 100 MG/1
100 CAPSULE, LIQUID FILLED ORAL 2 TIMES DAILY
Status: DISCONTINUED | OUTPATIENT
Start: 2017-12-31 | End: 2018-01-03 | Stop reason: HOSPADM

## 2017-12-31 RX ORDER — PREDNISONE 20 MG/1
40 TABLET ORAL DAILY
Status: DISCONTINUED | OUTPATIENT
Start: 2017-12-31 | End: 2018-01-03

## 2017-12-31 RX ORDER — OXYCODONE AND ACETAMINOPHEN 5; 325 MG/1; MG/1
1-2 TABLET ORAL EVERY 4 HOURS PRN
Status: DISCONTINUED | OUTPATIENT
Start: 2017-12-31 | End: 2018-01-02

## 2017-12-31 RX ORDER — ACETAMINOPHEN 325 MG/1
325-650 TABLET ORAL EVERY 4 HOURS PRN
Status: DISCONTINUED | OUTPATIENT
Start: 2017-12-31 | End: 2018-01-03 | Stop reason: HOSPADM

## 2017-12-31 RX ORDER — DICLOFENAC SODIUM 75 MG/1
75 TABLET, DELAYED RELEASE ORAL 2 TIMES DAILY
Refills: 1 | COMMUNITY
Start: 2017-12-21 | End: 2018-01-03 | Stop reason: HOSPADM

## 2017-12-31 RX ORDER — BACLOFEN 10 MG/1
20 TABLET ORAL 3 TIMES DAILY
Status: DISCONTINUED | OUTPATIENT
Start: 2017-12-31 | End: 2018-01-03 | Stop reason: HOSPADM

## 2017-12-31 RX ORDER — DIPHENHYDRAMINE HCL 25 MG
25 CAPSULE ORAL EVERY 4 HOURS PRN
Status: DISCONTINUED | OUTPATIENT
Start: 2017-12-31 | End: 2018-01-03 | Stop reason: HOSPADM

## 2017-12-31 RX ORDER — DIAZEPAM 10 MG/2ML
5 INJECTION INTRAMUSCULAR ONCE
Status: DISCONTINUED | OUTPATIENT
Start: 2017-12-31 | End: 2017-12-31

## 2017-12-31 RX ORDER — KETOROLAC TROMETHAMINE 30 MG/ML
30 INJECTION, SOLUTION INTRAMUSCULAR; INTRAVENOUS EVERY 6 HOURS PRN
Status: DISCONTINUED | OUTPATIENT
Start: 2017-12-31 | End: 2017-12-31

## 2017-12-31 RX ORDER — DIAZEPAM 5 MG/1
5 TABLET ORAL ONCE
Status: COMPLETED | OUTPATIENT
Start: 2017-12-31 | End: 2017-12-31

## 2017-12-31 RX ORDER — ONDANSETRON HYDROCHLORIDE 2 MG/ML
4 INJECTION, SOLUTION INTRAVENOUS EVERY 6 HOURS PRN
Status: DISCONTINUED | OUTPATIENT
Start: 2017-12-31 | End: 2018-01-03 | Stop reason: HOSPADM

## 2017-12-31 RX ADMIN — PREDNISONE 40 MG: 20 TABLET ORAL at 15:49

## 2017-12-31 RX ADMIN — ENOXAPARIN SODIUM 40 MG: 40 INJECTION SUBCUTANEOUS at 15:50

## 2017-12-31 RX ADMIN — OXYCODONE HYDROCHLORIDE AND ACETAMINOPHEN 2 TABLET: 5; 325 TABLET ORAL at 21:32

## 2017-12-31 RX ADMIN — BACLOFEN 20 MG: 10 TABLET ORAL at 21:31

## 2017-12-31 RX ADMIN — MORPHINE SULFATE 6 MG: 10 INJECTION, SOLUTION INTRAMUSCULAR; INTRAVENOUS at 12:58

## 2017-12-31 RX ADMIN — KETOROLAC TROMETHAMINE 30 MG: 30 INJECTION, SOLUTION INTRAMUSCULAR at 10:46

## 2017-12-31 RX ADMIN — MORPHINE SULFATE 4 MG: 4 INJECTION, SOLUTION INTRAMUSCULAR; INTRAVENOUS at 15:52

## 2017-12-31 RX ADMIN — LIDOCAINE 1 PATCH: 560 PATCH PERCUTANEOUS; TOPICAL; TRANSDERMAL at 11:54

## 2017-12-31 RX ADMIN — BACLOFEN 20 MG: 10 TABLET ORAL at 15:49

## 2017-12-31 RX ADMIN — OXYCODONE HYDROCHLORIDE AND ACETAMINOPHEN 2 TABLET: 5; 325 TABLET ORAL at 18:07

## 2017-12-31 RX ADMIN — DOCUSATE SODIUM 100 MG: 100 CAPSULE, LIQUID FILLED ORAL at 21:30

## 2017-12-31 RX ADMIN — DIPHENHYDRAMINE HCL 25 MG: 25 TABLET ORAL at 16:12

## 2017-12-31 RX ADMIN — DIAZEPAM 5 MG: 5 TABLET ORAL at 14:00

## 2017-12-31 ASSESSMENT — ACTIVITIES OF DAILY LIVING (ADL)
PATIENT'S MEMORY ADEQUATE TO SAFELY COMPLETE DAILY ACTIVITIES?: YES
ADEQUATE_TO_COMPLETE_ADL: YES

## 2017-12-31 NOTE — MEDICATION HISTORY SPECIALIST NOTES
Information sources:  EPIC  Outside Resources    Source verified by: [facility]    Allergies verified.    Patient is not able to verify his medications in detail, he said he is taking Percocet and Valium, but not able to say clearly when he last took it.  I called [facility] on [address] [place] [place] they verified all medication in epic with no other medication filled recently.

## 2017-12-31 NOTE — H&P
PCP:  No Pcp (Inactive)  DATE OF SERVICE: 12/31/17    CC:   Lateral neck pain with radiculopathy into the left arm    HPI:    Renaldo Salas is a 47 y.o. male who presents today after irretractable pain and radiculopathy that extended down into the left arm in the lateral left side of the patient's chest.  Patient was a poor historian, no medical history is on file but I am concerned about a developmental delay at baseline.  Patient was unable to give an accurate history of how long this pain has been going on.  Patient continued to endorse pain in his left forearm as well as numbness and tingling in his bilateral upper extremities.  Patient continued to have laterocollis to the left throughout the course of the exam.    Looking through the patient's medical record it appears the patient has been into the emergency room 3 times prior to his visit today.  He is also visited the urgent care in [facility], and an MRI was obtained at [facility].  The results of that scan showed left preforaminal and foraminal uncovertebral hypertrophy effaces the left C7 nerve root at C6-C7.  Kyphotic spine curvature due to chronic muscle tension was also noted.    During the previous ED visits the patient has been started on Flexeril, narcotic pain relievers, and benzodiazepines.  Despite these current therapies the patient's been having ongoing cervical neck pain and radiculopathy.      ROS:     Full review of systems limited due to limited cognitive ability, see HPI for pertinent positives.    PSHx:     Past Surgical History:   Procedure Laterality Date   • APPENDECTOMY         PMHx:     Past Medical History:   Diagnosis Date   • Chronic pain     NECK       ALLERGIES:     Allergies   Allergen Reactions   • Amoxicillin Itching       SOCIAL Hx:     Social History     Social History   • Marital status: Legally      Spouse name: N/A   • Number of children: N/A   • Years of education: N/A     Occupational History   •  Not on file.     Social History Main Topics   • Smoking status: Never Smoker   • Smokeless tobacco: Never Used   • Alcohol use No      Comment: 5X WEEKLY   • Drug use: No   • Sexual activity: Not on file     Other Topics Concern   • Not on file     Social History Narrative   • No narrative on file       FAMILY Hx:    No family history on file.    OUTPATIENT MEDICATIONS:     No current facility-administered medications on file prior to encounter.      Current Outpatient Prescriptions on File Prior to Encounter   Medication Sig Dispense Refill   • cyclobenzaprine (FLEXERIL) 10 mg tablet Take 1 tablet (10 mg total) by mouth 3 (three) times a day as needed for muscle spasms for up to 10 days. 30 tablet 0   • diazePAM (VALIUM) 5 mg tablet Take 1 tablet (5 mg total) by mouth every 6 (six) hours as needed for muscle spasms for up to 10 days. 30 tablet 0   • oxyCODONE-acetaminophen (PERCOCET) 5-325 mg per tablet Take 1 tablet by mouth every 4 (four) hours as needed for pain scale 4-7/10, 4-5/8 for up to 10 days. 30 tablet 0   • traMADol (ULTRAM 50) 50 mg tablet Take 50 mg by mouth every 6 (six) hours as needed for pain scale 4-7/10, 4-5/8.     • [DISCONTINUED] DICLOFENAC SODIUM ORAL Take by mouth.         CURRENT MEDICATIONS:   Scheduled Meds:    baclofen 20 mg oral 3x daily   docusate sodium 100 mg oral 2x daily   enoxaparin 40 mg subcutaneous Daily at 1400   predniSONE 40 mg oral Daily     PRN Meds:.•  acetaminophen  •  diphenhydrAMINE  •  magnesium hydroxide  •  morphine  •  ondansetron **OR** ondansetron  •  oxyCODONE-acetaminophen  D/C Meds:  Medications Discontinued During This Encounter   Medication Reason   • diazePAM (VALIUM) 5 mg/mL injection 5 mg    • DICLOFENAC SODIUM ORAL Duplicate order   • ketorolac (TORADOL) injection 30 mg    • lidocaine 4 % patch 1 patch        IMMUNIZATION Hx:      There is no immunization history on file for this patient.    PHYSICAL EXAMINATION:   GENERAL: Patient is a 47 y.o. old male  who appears their stated age, and in mild distress  HEENT: Muscle tightness noted on the right posterior aspect of the patient's neck, white conjunctive are noted, extraocular movements intact. Laterocollis to the left  CARDIO: Normal S1,S2 heart sounds. RRR. No murmurs, rubs, or gallops.   RESP: Clear to auscultation bilaterally without crackles, wheezes, or rales.   GI: Normoactive bowel sounds. Abdomen soft, nontender, and nondistended. No organomegaly appreciated   MSK: No bilateral lower limb pitting edema.  No deformities noted.  NEURO: CNII-XII grossly intact. EOMI. Patient moves all four extremities equally. No neurological deficits appreciated.  5 out of 5 muscle strength upper and lower limbs bilaterally.  Sensation intact upper and lower extremities noted.  DERM: No rashes noted.  PSYCH: Cognitive impairment- unclear if baseline    LABORATORY/IMAGING:     Relevant diagnostic, laboratory and radiological studies have been reviewed in the Electronic Medical Record in detail.    LABORATORY   CBC: No results found for: WBC, RBC, HGB, HCT, PLT  CMP: No results found for: NA, K, CL, CO2, GLUCOSE, CREATININE, CALCIUM, ALBUMIN, ALKPHOS, BILITOT, ALT, AST, BUN, ANIONGAP, BCR, GLOB  Coagulation: No results found for: PT, INR, APTT  ABGs: No results found for: PHART, PHCAP, PHVEN, PO2, PO2ART, PO2CAP, GHY3JOS, ROY6PBD, PCO2, BASEEXCESS    CULTURES  None      IMAGING  X-ray Cervical Spine Complete 4 Or 5 Views    Result Date: 12/23/2017  Narrative: EXAM : Cervical spine series 12/23/2017    CLINICAL HISTORY :  Neck pain radiating to left side     PROCEDURE/VIEWS : 4 views COMPARISON : None FINDINGS : There is reversal of usual cervical lordosis in the lower cervical region centered at C5-6. There is narrowing of the intervertebral disc space at C5-6 with endplate degenerative changes. There is no segmental vertebral subluxation. A discrete fracture is not identified.      Prevertebral soft tissues appear  unremarkable.         Impression: Degenerative changes at C5-6 with disc space narrowing and endplate degenerative changes and reversal of usual lordotic curvature.       EKG: none obtained.      ASSESSMENT   Admission Dx  Radiculopathy affecting upper extremity [M54.10]  Cervical radiculopathy due to intervertebral disc disorder [M50.10]    Problem List  None on record      PLAN     1.  Irretractable cervical pain with radiculopathy: Due to the patient's limited history is difficult to assess any initiating event.  Furthermore I am concerned that the patient does have an underlining developmental delay at baseline.  However no family was present during the course of our interview to substantiate this concern, however the patient does function and live on his own per his history.  -Begin baclofen 20 mg 3 times daily  -Begin prednisone 40 mg daily for a total 5 days followed by prednisone taper  -Tylenol as needed for pain every 4 hours per parameters  -Oxycodone 5-325 as needed for pain every 4 hours per parameters  -Morphine IV 3-4 mg as needed for pain every 3 hours her parameters  -Dihydroergotamine 25 mg every 4 hours as needed for muscle spasm  -We will consider an unofficial neurosurgery consult in the morning if pain and radiculopathy continues next    2.  Neurodevelopmental delay: As previously described there is concern about an underlying developmental delay, however due to no substantiating medical history we will consult family.      ADDITIONAL CARE NOTES     IVF: None  Diet: Regular  VTE Ppx:  Lovenox Sq  ABx: None  Electrolytes: Stable    CODE STATUS: Full Code     DISPO: Anticipate DC in 1 midnights, pending resolution of neck pain and radiculopathy.    Provider  Claude Retana, DO  3:28 PM, 12/31/2017.

## 2017-12-31 NOTE — PLAN OF CARE
Problem: Musculoskeletal - Adult  Goal: Return mobility to safest level of function  INTERVENTIONS:  1. Assess patient stability and activity tolerance for standing, transferring and ambulating w/ or w/o assistive devices  2. Assist with transfers and ambulation using safe practices  3. Ensure adequate protection for wounds/incisions during mobilization  4. Obtain PT/OT and other consults as needed  5. Apply Continuous Passive Motion per order to increase flexion toward goal  6. Instruct patient/family in ordered activity level  Outcome: Progressing   12/31/17 1646   Interventions Appropriate for this Patient   Return mobility to safest level of function Assess patient stability and activity tolerance for standing, transferring and ambulating with or without assistive devices;Assist with transfers and ambulation using safe practices;Apply Continuous Passive Motion per order to increase flexion toward goal;Ensure adequate protection for wounds/incisions during mobilization;Obtain PT/OT and other consults as needed;Instruct patient/family in ordered activity level     Goal: Maintain proper alignment of affected body part  INTERVENTIONS:  1. Support and protect limb and body alignment per provider's orders  2. Instruct and reinforce with patient and family use of appropriate assistive device and precautions (e.g. spinal or hip dislocation precautions)  Outcome: Progressing   12/31/17 1646   Interventions Appropriate for this Patient   Maintain proper alignment of affected body part Support and protect limb and body alignment per provider's orders;Instruct and reinforce with patient and family use of appropriate assistive device and precautions (e.g. spinal or hip dislocation precautions)     Goal: Return ADL status to a safe level of function  INTERVENTIONS:  1. Assess patient's ADL deficits and provide assistive devices as needed  2. Obtain PT/OT consults as needed  3. Assist and instruct patient to increase activity  and self care  Outcome: Progressing   12/31/17 1646   Interventions Appropriate for this Patient   Return activities of daily living status to a safe level of function Obtain PT/OT consults as needed;Assist and instruct patient to increase activity and self care;Assess patient's activities of daily living deficits and provide assistive devices as needed       Problem: Pain - Adult  Goal: Verbalizes/displays adequate comfort level or baseline comfort level  INTERVENTIONS:  1. Encourage patient to monitor pain and request interventions  2. Assess pain using the appropriate pain scale  3. Administer analgesics based on type and severity of pain and evaluate response  4. Educate/Implement non-pharmacological measures as appropriate and evaluate response  5. Consider cultural, developmental and social influences on pain and pain management  6. Notify Provider if interventions unsuccessful or patient reports new pain  Outcome: Progressing   12/31/17 1646   Interventions Appropriate for this Patient   Verbalizes/displays adequate comfort level or baseline comfort level Assess pain using the appropriate pain scale;Encourage patient to monitor pain and request interventions;Administer analgesics based on type and severity of pain and evaluate response;Consider cultural, developmental and social influences on pain and pain management;Educate/Implement non-pharmacological measures as appropriate and evaluate response;Notify Provider if interventions unsuccessful or patient reports new pain       Problem: Knowledge Deficit  Goal: Patient/family/caregiver demonstrates understanding of disease process, treatment plan, medications, and discharge instructions  INTERVENTIONS:   1. Complete learning assessment and assess knowledge base  2. Provide teaching at level of understanding   3. Provide teaching via preferred learning methods  Outcome: Progressing   12/31/17 1646   Interventions Appropriate for this Patient    Patient/family/caregiver demonstrates understanding of disease process, treatment plan, medications, and discharge instructions Complete learning assessment and assess knowledge base;Provide teaching at level of understanding;Provide teaching via preferred learning methods

## 2017-12-31 NOTE — PROGRESS NOTES
Senior cross-cover note:    S:   Renaldo is a 47-year-old male with past history of chronic neck pain who presents with intractable neck pain and cervical radiculopathy.  Patient is a poor historian and there is some concern that he may have developmental delay or other form of cognitive impairment.  Patient's family was not present at time of evaluation to provide further history or further insight into patient's cognitive functioning.  Patient initially states that this is the first time he has ever experienced neck pain and that it started earlier this morning.  However, on further questioning he states that he has had this in the past.  Review of records shows that he has been seen in the emergency department several times for this over the past week or so.  During these visits, patient has been treated with Flexeril, Percocet, Valium, Toradol.  Patient reports getting some relief with these measures.  However, he has reportedly taken several Percocet and Valium prior to presentation to the ED without relief.  Patient reports that the pain in his neck radiates to his left shoulder, down his left arm and into his left chest and back.  He reports that his fingers intermittently go numb but denies any numbness at present.  Patient is not able to clarify how long this has been going on.  He denies any known injury to his neck.    Medical records show that patient had an MRI on 12 20/2017 that showed a bulging disc at C7.  Per this report, there was left pre-foraminal and foraminal uncovertebral hypertrophy effaces the left C7 nerve root at C6-7 and kyphotic spine curvature due to chronic muscle tension.    In the ED, Dr. Wiseman performed trigger point injection of the left side of the neck.  Patient also received IV morphine and oral Valium.  A Lidoderm patch was also placed but went missing shortly after it was placed and patient does not recall what happened to it.    O:  /86 (BP Location: Right arm, Patient  "Position: Sitting)   Pulse 64   Temp 36.4 °C (97.5 °F) (Oral)   Resp 16   Ht 1.854 m (6' 0.99\")   Wt 85.7 kg (188 lb 15 oz)   SpO2 95%   BMI 24.93 kg/m²     Physical Exam   Constitutional: He is oriented to person, place, and time. He appears well-developed and well-nourished.   Patient appears acutely uncomfortable but not in acute distress.   HENT:   Head: Normocephalic and atraumatic.   Nose: Nose normal.   Mouth/Throat: Oropharynx is clear and moist.   Eyes: Conjunctivae and EOM are normal.   Neck:   Cervical dystonia noted with laterocollis to the left.  Range of motion limited due to muscle spasm of paraspinal muscles.  Tight, muscle spasm palpated of bilateral paraspinal muscles and right sternocleidomastoid.   Cardiovascular: Normal rate, regular rhythm and normal heart sounds.    No murmur heard.  Pulmonary/Chest: Effort normal and breath sounds normal. No respiratory distress.   Abdominal: Soft. Bowel sounds are normal. There is no tenderness.   Musculoskeletal: He exhibits tenderness and deformity.   See description under neck exam.   Neurological: He is alert and oriented to person, place, and time.   Impaired cognitive functioning-unclear if baseline.  Sensation grossly intact to bilateral arms and hands and bilateral lower extremities.   strength intact and symmetric bilaterally.   Skin: Skin is warm and dry.   Psychiatric:   Memory impairment.  Cognitive impairment-unclear baseline.     A/P: 47-year-old male with reported history of chronic neck pain being admitted for intractable neck pain and cervical radiculopathy.    1.  Intractable neck pain with cervical radiculopathy  -Patient is a poor historian so unclear how long this has been going on  -Due to cervical radiculopathy, we will treat with prednisone 40 mg daily ×5 days followed by prednisone taper  -See below for treatment of muscle spasm  -As needed Tylenol, oxycodone, morphine available for pain  -With MRI findings of C7 disc " herniation, will consider neurosurgery consult versus outpatient referral following medical conservative management    2.  Cervical dystonia-laterocollis  -We will treat muscle spasm with baclofen 20 mg 3 times daily  -We will also have as needed Benadryl available 25 mg every 4 hours for muscle spasm    Please see Dr. Retana's H&P for full details.

## 2018-01-01 ENCOUNTER — APPOINTMENT (OUTPATIENT)
Dept: RADIOLOGY | Facility: HOSPITAL | Age: 48
DRG: 552 | End: 2018-01-01
Payer: COMMERCIAL

## 2018-01-01 ENCOUNTER — APPOINTMENT (OUTPATIENT)
Dept: CT IMAGING | Facility: HOSPITAL | Age: 48
DRG: 552 | End: 2018-01-01
Payer: COMMERCIAL

## 2018-01-01 PROCEDURE — 97161 PT EVAL LOW COMPLEX 20 MIN: CPT | Performed by: PHYSICAL THERAPIST

## 2018-01-01 PROCEDURE — 2590000100 HC RX 259: Performed by: STUDENT IN AN ORGANIZED HEALTH CARE EDUCATION/TRAINING PROGRAM

## 2018-01-01 PROCEDURE — 70470 CT HEAD/BRAIN W/O & W/DYE: CPT

## 2018-01-01 PROCEDURE — 2500000200 HC RX 250 WO HCPCS: Performed by: STUDENT IN AN ORGANIZED HEALTH CARE EDUCATION/TRAINING PROGRAM

## 2018-01-01 PROCEDURE — 72040 X-RAY EXAM NECK SPINE 2-3 VW: CPT

## 2018-01-01 PROCEDURE — 2550000100 HC RX 255: Performed by: STUDENT IN AN ORGANIZED HEALTH CARE EDUCATION/TRAINING PROGRAM

## 2018-01-01 PROCEDURE — 99225 *NO LONGER ACTIVE 2023 DO NOT USE*  PR SBSQ OBSERVATION CARE/DAY 25 MIN: CPT | Mod: GC | Performed by: STUDENT IN AN ORGANIZED HEALTH CARE EDUCATION/TRAINING PROGRAM

## 2018-01-01 PROCEDURE — 97110 THERAPEUTIC EXERCISES: CPT | Performed by: PHYSICAL THERAPIST

## 2018-01-01 PROCEDURE — 6370000100 HC RX 637 (ALT 250 FOR IP): Performed by: STUDENT IN AN ORGANIZED HEALTH CARE EDUCATION/TRAINING PROGRAM

## 2018-01-01 PROCEDURE — 6360000200 HC RX 636 W HCPCS (ALT 250 FOR IP): Performed by: STUDENT IN AN ORGANIZED HEALTH CARE EDUCATION/TRAINING PROGRAM

## 2018-01-01 PROCEDURE — 73030 X-RAY EXAM OF SHOULDER: CPT | Mod: LT

## 2018-01-01 PROCEDURE — G0378 HOSPITAL OBSERVATION PER HR: HCPCS

## 2018-01-01 RX ORDER — GABAPENTIN 100 MG/1
100 CAPSULE ORAL 3 TIMES DAILY
Status: DISCONTINUED | OUTPATIENT
Start: 2018-01-01 | End: 2018-01-02

## 2018-01-01 RX ORDER — OXYCODONE HYDROCHLORIDE 5 MG/1
10 TABLET ORAL EVERY 4 HOURS PRN
Status: DISCONTINUED | OUTPATIENT
Start: 2018-01-01 | End: 2018-01-03 | Stop reason: HOSPADM

## 2018-01-01 RX ORDER — IOPAMIDOL 755 MG/ML
100 INJECTION, SOLUTION INTRAVASCULAR ONCE
Status: COMPLETED | OUTPATIENT
Start: 2018-01-01 | End: 2018-01-01

## 2018-01-01 RX ADMIN — MORPHINE SULFATE 4 MG: 4 INJECTION, SOLUTION INTRAMUSCULAR; INTRAVENOUS at 11:25

## 2018-01-01 RX ADMIN — GABAPENTIN 100 MG: 100 CAPSULE ORAL at 20:57

## 2018-01-01 RX ADMIN — BACLOFEN 20 MG: 10 TABLET ORAL at 08:47

## 2018-01-01 RX ADMIN — MORPHINE SULFATE 4 MG: 4 INJECTION, SOLUTION INTRAMUSCULAR; INTRAVENOUS at 06:21

## 2018-01-01 RX ADMIN — GABAPENTIN 100 MG: 100 CAPSULE ORAL at 14:40

## 2018-01-01 RX ADMIN — GABAPENTIN 100 MG: 100 CAPSULE ORAL at 08:48

## 2018-01-01 RX ADMIN — MORPHINE SULFATE 4 MG: 4 INJECTION, SOLUTION INTRAMUSCULAR; INTRAVENOUS at 18:23

## 2018-01-01 RX ADMIN — DOCUSATE SODIUM 100 MG: 100 CAPSULE, LIQUID FILLED ORAL at 08:48

## 2018-01-01 RX ADMIN — OXYCODONE HYDROCHLORIDE AND ACETAMINOPHEN 2 TABLET: 5; 325 TABLET ORAL at 20:58

## 2018-01-01 RX ADMIN — DIPHENHYDRAMINE HCL 25 MG: 25 TABLET ORAL at 20:57

## 2018-01-01 RX ADMIN — OXYCODONE HYDROCHLORIDE AND ACETAMINOPHEN 2 TABLET: 5; 325 TABLET ORAL at 02:19

## 2018-01-01 RX ADMIN — DIPHENHYDRAMINE HCL 25 MG: 25 TABLET ORAL at 12:44

## 2018-01-01 RX ADMIN — OXYCODONE HYDROCHLORIDE AND ACETAMINOPHEN 2 TABLET: 5; 325 TABLET ORAL at 08:47

## 2018-01-01 RX ADMIN — PREDNISONE 40 MG: 20 TABLET ORAL at 08:47

## 2018-01-01 RX ADMIN — DOCUSATE SODIUM 100 MG: 100 CAPSULE, LIQUID FILLED ORAL at 20:57

## 2018-01-01 RX ADMIN — ENOXAPARIN SODIUM 40 MG: 40 INJECTION SUBCUTANEOUS at 14:40

## 2018-01-01 RX ADMIN — MORPHINE SULFATE 4 MG: 4 INJECTION, SOLUTION INTRAMUSCULAR; INTRAVENOUS at 14:40

## 2018-01-01 RX ADMIN — DIPHENHYDRAMINE HCL 25 MG: 25 TABLET ORAL at 02:19

## 2018-01-01 RX ADMIN — BACLOFEN 20 MG: 10 TABLET ORAL at 14:40

## 2018-01-01 RX ADMIN — OXYCODONE HYDROCHLORIDE AND ACETAMINOPHEN 2 TABLET: 5; 325 TABLET ORAL at 12:44

## 2018-01-01 RX ADMIN — IOPAMIDOL 100 ML: 755 INJECTION, SOLUTION INTRAVENOUS at 18:15

## 2018-01-01 RX ADMIN — BACLOFEN 20 MG: 10 TABLET ORAL at 20:57

## 2018-01-01 RX ADMIN — DIPHENHYDRAMINE HCL 25 MG: 25 TABLET ORAL at 16:43

## 2018-01-01 RX ADMIN — OXYCODONE HYDROCHLORIDE AND ACETAMINOPHEN 2 TABLET: 5; 325 TABLET ORAL at 16:43

## 2018-01-01 RX ADMIN — DIPHENHYDRAMINE HCL 25 MG: 25 TABLET ORAL at 08:47

## 2018-01-01 NOTE — INTERDISCIPLINARY/THERAPY
01/01/18 1420   PT Last Visit   PT Received On 01/01/18   Visit Number 1   General   Chart Reviewed Yes   Therapy Treatment Diagnosis neck pain with L UE radiculopathy   Is this a Co-Treatment? No   Family/Caregiver Present No   Home Living   Type of Home House   Home Layout Two level   Home Access Stairs to enter with rails   Prior Function   Level of [place] Independent with ADLs and functional transfers   Lived With Alone   ADL Assistance Independent   IADL Assistance Independent   Driving Yes   Vocational Status Full time employment   Vocational Type IT; works from home   Prior Function Comments Reports no history of injury or falls; unsure how the pain started   Subjective Comments   Subjective Comments RN okayed PT and pt agreed.  MD present at beg of treatment.   Pain Assessment   Pain Assessment 0-10   Pain Score 7   Pain Type Chronic pain   Pain Location Shoulder   Pain Orientation Left   Pain Descriptors Sharp   Pain Frequency Constant/continuous   Pain Onset Progressive   Clinical Progression Not changed   Multiple Pain Sites Two   Pain 2   Pain Score 2 8   Pain Type 2 Chronic pain   Pain Location 2 Neck   Pain Descriptors 2 Aching   Pain Frequency 2 Constant/continuous   Pain Onset 2 Progressive   Cognition   Orientation Level Oriented X4  (unsure of onset of pain)   Light Touch   LUE Light Touch Grossly intact  (extremely sensitive to light touch)   Sharp/Dull   LUE Sharp/Dull Grossly intact  (Unable to tolerate dull touch to L UE)   Bed Mobility   Bed Mobility Assessed   Bed Mobility 1   Bed Mobility From 1 Supine   Bed Mobility Type 1 To and from   Bed Mobility to 1 Short sit   Level of Assistance 1 Independent   Bed Mobility Comments 1 Unable to relax head down in supine; required multiple pillows and position changes   Transfers   Transfer Assessed   Transfer 1   Transfer From 1 Sit   Transfer Type 1 To and from   Transfer to 1 Stand   Technique 1 Sit to stand   Level of Assistance 1  Independent   Ambulation 1   Surface 1 Level surface   Device 1 No device   Assistance 1 Independent   Cervical Spine Assessment   Cervical Spine Assessment X   Cervical Spine AROM   Cervical Spine Flexion (Able to look down to toes)   Cervical Spine Extension (Able to look up to ceiling)   Cervical Spine R Lateral Flexion 0  (Unble to perform R LF AROM)   Cervical Spine L Lateral Flexion (Neck fixed in approx 25 deg of left lateral flexion)   Cervical Spine R Rotation 25  (Approx 25 degrees; unable to read sign to the right of him)   Cervical Spine L Rotation (WNL)   LUE AROM (degrees)   LUE AROM Comment Flexion WNL (pain); functional IR and ER symmetrical to right with increased pain   Assessment   Rehab Potential Fair   Problem List Decreased strength;Decreased range of motion;Pain   Problem List Comments Deficits do not seem to be consistent with observed function; no psychiatric deficits noted in PMH   Assessment Comment Patient complains of increased left side neck, back, and arm pain.  Pt was able to perform left shoulder AROM WNL at times and other times unable to lift L UE due to pain.  Able to perform cervical AROM when prompted 50% of the time.  Pt was able to acheive neutral cervical posture when lying in bed.  Would benefit from PT for cervical ROM and left UE strengthening exercises. Outpatient PT may be appropriate when pain is under control and pt is discharged.    Recommendation   Recommendations for Therapy Continue skilled therapy;Outpatient Therapy   Plan   Treatment Interventions Therapeutic exercises;Manual therapy;Modalities;Pain education/TNE   PT Frequency 4-6x/wk   Plan Comment Education on movement, pain; cervical and left upper extremity ROM and strengthening exercises

## 2018-01-01 NOTE — PROGRESS NOTES
DAILY PROGRESS NOTE    Renaldo Salas is a 47 y.o. old male admitted on 12/31/2017.    Date of Service: 01/01/18    SUBJECTIVE   Patient seen and examined.  Patient continues to have severe lateral neck pain and radiculopathy.  Today describes the pain as a burning sensation.  He isolates his pain more to the left shoulder today.  Despite ongoing treatment the patient is still tearful on exam.  Pain was elicited to palpation, however muscle strength and testing were normal and did not elicit any pain.     Patient's mother was contacted today.  She states that she believes the pain started around 20 December.  However did note that he had similar type pain last year as well.  She also stated that the patient's brother has a history of schizophrenia and there is a family history of Asperger's as well.  However she requested that the patient not be worked up for any psychiatric diseases as she feels it would complicate his ability to recover from his current neck pain.  She also states that the patient has maintained a regular job, but did have one incident at his job which required him to go to anger management.  Patient's mother also reported the patient has been seen at [facility] and has a follow-up appointment on January 2, 2018.  Patient's mother also reported no inciting event prior to the pain beginning.    However with further discussion with the patient's mother she did state that the patient has had a change in his baseline.  Furthermore the patient was unable to recall yesterday why he came to the emergency room and again this morning was unclear why he was in the hospital.  Thus while there could be a psychiatric component, I am concerned that due to the change from the patient's baseline per family's history there could be an underlying neurological process.    Difficult to ascertain based on no medical history, but does appear to patient does suffer from a developmental delay.  Thus review  of systems was limited and difficult to ascertain.  See HPI for pertinent positive.      Past Medical History     Past Medical History:   Diagnosis Date   • Chronic pain     NECK   • Injury of back    • Peripheral neuropathy (CMS/HCC)        OBJECTIVE     Current Vital Signs:  Vitals:    01/01/18 0621   BP: 127/65   Pulse: 61   Resp: 16   Temp: 36.4 °C (97.5 °F)   SpO2: 96%       Intake and Output Last 24 Hours    Intake/Output Summary (Last 24 hours) at 01/01/18 1034  Last data filed at 01/01/18 0554   Gross per 24 hour   Intake              750 ml   Output                0 ml   Net              750 ml          Exam     GENERAL: Patient is a 47 y.o. old male who appears their stated age, and in no distress  HEENT: EOMI, moist mucus membranes.   CARDIO: Normal S1,S2 heart sounds. RRR. No murmurs, rubs, or gallops.   RESP: Clear to auscultation bilaterally without crackles, wheezes, or rales.   GI: Normoactive bowel sounds. Abdomen soft, nontender, and nondistended. No organomegaly appreciated   NEURO: CNII-XII grossly intact. No neurological deficits appreciated.    MSK: 5 out of 5 muscle strength upper and lower extremities.  5 out of 5 muscle strength in the neck musculature, with full range of motion noted on exam.  Pain to palpation on the left shoulder, with pain elicited with abduction above 90°.   DERM: No rashes noted.  PSYCH: No depression noted.  Suspicion of developmental delay.     Medications:   Scheduled Meds:  baclofen 20 mg oral 3x daily   docusate sodium 100 mg oral 2x daily   enoxaparin 40 mg subcutaneous Daily at 1400   gabapentin 100 mg oral 3x daily   predniSONE 40 mg oral Daily     PRN Meds:.•  acetaminophen  •  diphenhydrAMINE  •  magnesium hydroxide  •  morphine  •  ondansetron **OR** ondansetron  •  oxyCODONE-acetaminophen    Diagnostic Data:      CBC:   No results found for: WBC, RBC, HGB, HCT, PLT  CMP: Lab Results   Component Value Date     12/31/2017    K 3.7 12/31/2017      12/31/2017    CO2 29 12/31/2017    GLUCOSE 82 12/31/2017    CREATININE 0.8 12/31/2017    CALCIUM 8.6 12/31/2017    ALBUMIN 3.5 12/31/2017    ALKPHOS 43 (L) 12/31/2017    BILITOT 0.45 12/31/2017    ALT 61 (H) 12/31/2017    AST 31 12/31/2017    BUN 16 12/31/2017    ANIONGAP 6 12/31/2017     Coagulation:   No results found for: PT, INR, APTT  ABGs: No results found for: PHART, PHCAP, PHVEN, PO2, PO2ART, PO2CAP, QLX5OSZ, HJG1MRH, PCO2, BASEEXCESS    Blood cultures: None  Urine cultures: None    EKG: None obtained.    ASSESSMENT     Patient Active Problem List   Diagnosis   • Cervical radiculopathy due to intervertebral disc disorder         PLAN       1.  Cervical radiculopathy and irretractable pain: Due to the patient still having extreme pain on exam, I am concerned about the patient possibly having pain secondary to his bulging disc.  There is also possibility the pain could be isolated from a shoulder injury not diagnosed at this time.  Is still difficult to ascertain due to the patient's assumed developmental delay, as the patient's a poor historian unable to give an accurate description of his symptoms.  However on exam patient's neck musculature does not feel as if they are spasming to the degree as previously noted at admission.  -Begin gabapentin 100 mg 3 times daily for neuropathic pain  -Obtain 3 view shoulder x-ray  -Obtain repeat cervical spine x-ray  -Continue Tylenol as needed for pain every 4 hours with pain parameters  -Continue Percocet 1-2 tabs every 4 hours as needed for pain with pain parameters  -Continue morphine 3-4 mg IV every 3 hours as needed for pain with pain parameters  -Continue physical therapy next    2.  Change in cognitive behavior  -We will obtain a head CT to rule out any neurological process      ADDITIONAL CARE NOTES     IVF: None  Diet: Regular  VTE Ppx:  Lovenox Sq  ABx: None  Electrolytes: Stable    CODE STATUS: Full Code     DISPO: Anticipate DC to home pending resolution of  irretractable neck pain, likely 1-2 midnights.    Provider  Claude Retana DO  10:34 AM, 1/1/2018.

## 2018-01-01 NOTE — NURSING END OF SHIFT
Nursing End of Shift Summary    Goals:  Clinical Goals for the Shift: pain control    Narrative Summary of Progress Towards Clinical Goals:  Pain control with prn pain medication    Barriers for Transfer or Discharge: no

## 2018-01-01 NOTE — PLAN OF CARE
Problem: Musculoskeletal - Adult  Goal: Return mobility to safest level of function  INTERVENTIONS:  1. Assess patient stability and activity tolerance for standing, transferring and ambulating w/ or w/o assistive devices  2. Assist with transfers and ambulation using safe practices  3. Ensure adequate protection for wounds/incisions during mobilization  4. Obtain PT/OT and other consults as needed  5. Apply Continuous Passive Motion per order to increase flexion toward goal  6. Instruct patient/family in ordered activity level   Outcome: Progressing   01/01/18 0033   Interventions Appropriate for this Patient   Return mobility to safest level of function Assess patient stability and activity tolerance for standing, transferring and ambulating with or without assistive devices;Assist with transfers and ambulation using safe practices     Goal: Maintain proper alignment of affected body part  INTERVENTIONS:  1. Support and protect limb and body alignment per provider's orders  2. Instruct and reinforce with patient and family use of appropriate assistive device and precautions (e.g. spinal or hip dislocation precautions)   Outcome: Progressing   01/01/18 0033   Interventions Appropriate for this Patient   Maintain proper alignment of affected body part Instruct and reinforce with patient and family use of appropriate assistive device and precautions (e.g. spinal or hip dislocation precautions)     Goal: Return ADL status to a safe level of function  INTERVENTIONS:  1. Assess patient's ADL deficits and provide assistive devices as needed  2. Obtain PT/OT consults as needed  3. Assist and instruct patient to increase activity and self care   Outcome: Progressing   01/01/18 0033   Interventions Appropriate for this Patient   Return activities of daily living status to a safe level of function Assess patient's activities of daily living deficits and provide assistive devices as needed       Problem: Pain - Adult  Goal:  Verbalizes/displays adequate comfort level or baseline comfort level  INTERVENTIONS:  1. Encourage patient to monitor pain and request interventions  2. Assess pain using the appropriate pain scale  3. Administer analgesics based on type and severity of pain and evaluate response  4. Educate/Implement non-pharmacological measures as appropriate and evaluate response  5. Consider cultural, developmental and social influences on pain and pain management  6. Notify Provider if interventions unsuccessful or patient reports new pain   Outcome: Progressing   01/01/18 0033   Interventions Appropriate for this Patient   Verbalizes/displays adequate comfort level or baseline comfort level Encourage patient to monitor pain and request interventions;Assess pain using the appropriate pain scale       Problem: Knowledge Deficit  Goal: Patient/family/caregiver demonstrates understanding of disease process, treatment plan, medications, and discharge instructions  INTERVENTIONS:   1. Complete learning assessment and assess knowledge base  2. Provide teaching at level of understanding   3. Provide teaching via preferred learning methods   Outcome: Progressing   01/01/18 0033   Interventions Appropriate for this Patient   Patient/family/caregiver demonstrates understanding of disease process, treatment plan, medications, and discharge instructions Complete learning assessment and assess knowledge base;Provide teaching at level of understanding

## 2018-01-01 NOTE — PROGRESS NOTES
FMR Senior Cross-Cover note:    This note is meant to accompany the progress note by Dr. Retana from today.    Patient was seen and examined. I agree with Dr. Retana's evaluation, assessment, and plan as documented in their note. XR of cervical spine unchanged from prior. XR left shoulder unremarkable. Will add gabapentin to pain regimen. Consider discussing case with neurosurgery Please see Dr. Retana's note for further details.    ZAIN LOPEZ MD  1/1/2018 12:56 PM

## 2018-01-01 NOTE — NURSING END OF SHIFT
Nursing End of Shift Summary    Goals:  Clinical Goals for the Shift: pain control    Narrative Summary of Progress Towards Clinical Goals:  Patient pain was manage with both oral and iv medication    Barriers for Transfer or Discharge: yes  Still complaining of severe back pain

## 2018-01-02 PROCEDURE — 2590000100 HC RX 259: Performed by: STUDENT IN AN ORGANIZED HEALTH CARE EDUCATION/TRAINING PROGRAM

## 2018-01-02 PROCEDURE — 6360000200 HC RX 636 W HCPCS (ALT 250 FOR IP): Performed by: STUDENT IN AN ORGANIZED HEALTH CARE EDUCATION/TRAINING PROGRAM

## 2018-01-02 PROCEDURE — (BLANK) HC ROOM SEMI PRIVATE

## 2018-01-02 PROCEDURE — 2500000200 HC RX 250 WO HCPCS: Performed by: STUDENT IN AN ORGANIZED HEALTH CARE EDUCATION/TRAINING PROGRAM

## 2018-01-02 PROCEDURE — 6370000100 HC RX 637 (ALT 250 FOR IP): Performed by: STUDENT IN AN ORGANIZED HEALTH CARE EDUCATION/TRAINING PROGRAM

## 2018-01-02 PROCEDURE — 99232 SBSQ HOSP IP/OBS MODERATE 35: CPT | Mod: GC | Performed by: STUDENT IN AN ORGANIZED HEALTH CARE EDUCATION/TRAINING PROGRAM

## 2018-01-02 RX ORDER — CELECOXIB 200 MG/1
200 CAPSULE ORAL 2 TIMES DAILY
Status: DISCONTINUED | OUTPATIENT
Start: 2018-01-02 | End: 2018-01-03 | Stop reason: HOSPADM

## 2018-01-02 RX ORDER — LIDOCAINE 560 MG/1
1 PATCH PERCUTANEOUS; TOPICAL; TRANSDERMAL DAILY
Status: DISCONTINUED | OUTPATIENT
Start: 2018-01-02 | End: 2018-01-03 | Stop reason: HOSPADM

## 2018-01-02 RX ORDER — FENTANYL CITRATE 50 UG/ML
25 INJECTION, SOLUTION INTRAMUSCULAR; INTRAVENOUS
Status: DISCONTINUED | OUTPATIENT
Start: 2018-01-02 | End: 2018-01-03 | Stop reason: HOSPADM

## 2018-01-02 RX ORDER — GABAPENTIN 100 MG/1
200 CAPSULE ORAL 3 TIMES DAILY
Status: DISCONTINUED | OUTPATIENT
Start: 2018-01-02 | End: 2018-01-03 | Stop reason: HOSPADM

## 2018-01-02 RX ORDER — HYDROMORPHONE HYDROCHLORIDE 1 MG/ML
1 INJECTION, SOLUTION INTRAMUSCULAR; INTRAVENOUS; SUBCUTANEOUS ONCE
Status: COMPLETED | OUTPATIENT
Start: 2018-01-02 | End: 2018-01-02

## 2018-01-02 RX ADMIN — GABAPENTIN 200 MG: 100 CAPSULE ORAL at 21:07

## 2018-01-02 RX ADMIN — GABAPENTIN 100 MG: 100 CAPSULE ORAL at 08:09

## 2018-01-02 RX ADMIN — OXYCODONE HYDROCHLORIDE 10 MG: 5 TABLET ORAL at 12:38

## 2018-01-02 RX ADMIN — OXYCODONE HYDROCHLORIDE AND ACETAMINOPHEN 2 TABLET: 5; 325 TABLET ORAL at 08:08

## 2018-01-02 RX ADMIN — MORPHINE SULFATE 4 MG: 4 INJECTION, SOLUTION INTRAMUSCULAR; INTRAVENOUS at 09:50

## 2018-01-02 RX ADMIN — ONDANSETRON 4 MG: 2 INJECTION INTRAMUSCULAR; INTRAVENOUS at 05:58

## 2018-01-02 RX ADMIN — MORPHINE SULFATE 4 MG: 4 INJECTION, SOLUTION INTRAMUSCULAR; INTRAVENOUS at 01:23

## 2018-01-02 RX ADMIN — BACLOFEN 20 MG: 10 TABLET ORAL at 21:07

## 2018-01-02 RX ADMIN — DOCUSATE SODIUM 100 MG: 100 CAPSULE, LIQUID FILLED ORAL at 21:08

## 2018-01-02 RX ADMIN — CELECOXIB 200 MG: 200 CAPSULE ORAL at 09:50

## 2018-01-02 RX ADMIN — BACLOFEN 20 MG: 10 TABLET ORAL at 08:08

## 2018-01-02 RX ADMIN — BACLOFEN 20 MG: 10 TABLET ORAL at 14:46

## 2018-01-02 RX ADMIN — OXYCODONE HYDROCHLORIDE 10 MG: 5 TABLET ORAL at 21:08

## 2018-01-02 RX ADMIN — OXYCODONE HYDROCHLORIDE AND ACETAMINOPHEN 2 TABLET: 5; 325 TABLET ORAL at 04:15

## 2018-01-02 RX ADMIN — OXYCODONE HYDROCHLORIDE 10 MG: 5 TABLET ORAL at 17:05

## 2018-01-02 RX ADMIN — LIDOCAINE 1 PATCH: 560 PATCH PERCUTANEOUS; TOPICAL; TRANSDERMAL at 22:50

## 2018-01-02 RX ADMIN — PREDNISONE 40 MG: 20 TABLET ORAL at 08:09

## 2018-01-02 RX ADMIN — DIPHENHYDRAMINE HCL 25 MG: 25 TABLET ORAL at 08:17

## 2018-01-02 RX ADMIN — DIPHENHYDRAMINE HCL 25 MG: 25 TABLET ORAL at 21:08

## 2018-01-02 RX ADMIN — CELECOXIB 200 MG: 200 CAPSULE ORAL at 21:08

## 2018-01-02 RX ADMIN — ENOXAPARIN SODIUM 40 MG: 40 INJECTION SUBCUTANEOUS at 14:47

## 2018-01-02 RX ADMIN — FENTANYL CITRATE 25 MCG: 50 INJECTION INTRAMUSCULAR; INTRAVENOUS at 22:21

## 2018-01-02 RX ADMIN — HYDROMORPHONE HYDROCHLORIDE 1 MG: 1 INJECTION, SOLUTION INTRAMUSCULAR; INTRAVENOUS; SUBCUTANEOUS at 03:13

## 2018-01-02 RX ADMIN — DOCUSATE SODIUM 100 MG: 100 CAPSULE, LIQUID FILLED ORAL at 08:09

## 2018-01-02 RX ADMIN — DIPHENHYDRAMINE HCL 25 MG: 25 TABLET ORAL at 03:34

## 2018-01-02 RX ADMIN — GABAPENTIN 200 MG: 100 CAPSULE ORAL at 14:45

## 2018-01-02 RX ADMIN — MORPHINE SULFATE 4 MG: 4 INJECTION, SOLUTION INTRAMUSCULAR; INTRAVENOUS at 19:30

## 2018-01-02 NOTE — PROGRESS NOTES
SENIOR FMR RESIDENT CROSS-COVER NOTE:     This note is meant to accompany the progress note by Dr. Retana from today.    Patient was seen and examined. I agree with Dr. Retana's evaluation, assessment, and plan as documented in their note. Patient's case was discussed with attending physician, Dr. Benton, at team rounds. Continued pain despite current regimen, will adjust today. Will discuss case with ortho for further recs. Please see Dr. Retana's note for further details regarding today's plan of care.    ZAIN LOPEZ MD  1/2/2018 2:40 PM

## 2018-01-02 NOTE — NURSING END OF SHIFT
Nursing End of Shift Summary    Goals:  Clinical Goals for the Shift: Patient's pain is controlled with as many non-pharmacologic interventions as possible, patient's psyche is balanced with therapeutic communication and redirection.    Narrative Summary of Progress Towards Clinical Goals:  Pain was only controlled for brief time early in the evening with percocet and benadryl. The rest of the evening, it was not. The float pool CRN assisted in getting 1mg dilaudid iv push once ordered for the patient which after administration he was still uncontrolled with severe pain. His percocet was administered again which after he had 2 emesis. Kpad was utilized as well as an ice pack. Therapeutic communication was used with patient. He was in excruciating pain with no more medication to be given at this time.  He has an appointment at  orthopedics at 2:20 pm today which he needs to be discharged in time for. The patient is aware that he needs transportation to this and stated he has it lined up.    Barriers for Transfer or Discharge: yes  Pain control.

## 2018-01-02 NOTE — PLAN OF CARE
Problem: Musculoskeletal - Adult  Goal: Return mobility to safest level of function  INTERVENTIONS:  1. Assess patient stability and activity tolerance for standing, transferring and ambulating w/ or w/o assistive devices  2. Assist with transfers and ambulation using safe practices  3. Ensure adequate protection for wounds/incisions during mobilization  4. Obtain PT/OT and other consults as needed  5. Apply Continuous Passive Motion per order to increase flexion toward goal  6. Instruct patient/family in ordered activity level   Outcome: Progressing   01/01/18 2209   Interventions Appropriate for this Patient   Return mobility to safest level of function Assess patient stability and activity tolerance for standing, transferring and ambulating with or without assistive devices;Assist with transfers and ambulation using safe practices;Ensure adequate protection for wounds/incisions during mobilization;Obtain PT/OT and other consults as needed;Apply Continuous Passive Motion per order to increase flexion toward goal;Instruct patient/family in ordered activity level     Goal: Maintain proper alignment of affected body part  INTERVENTIONS:  1. Support and protect limb and body alignment per provider's orders  2. Instruct and reinforce with patient and family use of appropriate assistive device and precautions (e.g. spinal or hip dislocation precautions)   Outcome: Progressing   01/01/18 2209   Interventions Appropriate for this Patient   Maintain proper alignment of affected body part Support and protect limb and body alignment per provider's orders;Instruct and reinforce with patient and family use of appropriate assistive device and precautions (e.g. spinal or hip dislocation precautions)     Goal: Return ADL status to a safe level of function  INTERVENTIONS:  1. Assess patient's ADL deficits and provide assistive devices as needed  2. Obtain PT/OT consults as needed  3. Assist and instruct patient to increase  activity and self care   Outcome: Progressing   01/01/18 2209   Interventions Appropriate for this Patient   Return activities of daily living status to a safe level of function Assess patient's activities of daily living deficits and provide assistive devices as needed;Obtain PT/OT consults as needed;Assist and instruct patient to increase activity and self care       Problem: Pain - Adult  Goal: Verbalizes/displays adequate comfort level or baseline comfort level  INTERVENTIONS:  1. Encourage patient to monitor pain and request interventions  2. Assess pain using the appropriate pain scale  3. Administer analgesics based on type and severity of pain and evaluate response  4. Educate/Implement non-pharmacological measures as appropriate and evaluate response  5. Consider cultural, developmental and social influences on pain and pain management  6. Notify Provider if interventions unsuccessful or patient reports new pain   Outcome: Progressing   01/01/18 0033   Interventions Appropriate for this Patient   Verbalizes/displays adequate comfort level or baseline comfort level Encourage patient to monitor pain and request interventions;Assess pain using the appropriate pain scale       Problem: Knowledge Deficit  Goal: Patient/family/caregiver demonstrates understanding of disease process, treatment plan, medications, and discharge instructions  INTERVENTIONS:   1. Complete learning assessment and assess knowledge base  2. Provide teaching at level of understanding   3. Provide teaching via preferred learning methods   Outcome: Progressing   01/01/18 0033   Interventions Appropriate for this Patient   Patient/family/caregiver demonstrates understanding of disease process, treatment plan, medications, and discharge instructions Complete learning assessment and assess knowledge base;Provide teaching at level of understanding

## 2018-01-02 NOTE — INTERDISCIPLINARY/THERAPY
01/02/18 1110   General   Chart Reviewed Yes   Is this a Co-Treatment? No   Family/Caregiver Present No   Therapeutic Activities   Therapeutic Activities Discussed pt's symptoms and the related anatomy. He was in significant pain with light touch. He had hot pack on shoulder and head rotated to left and flexed. He demonstrated functional Left shoulder ROM however then was also limited by pain. He had increased tissue tension in his left upper trap however did not tolerate soft tissue mobilization. He was educated on ice progression and was given ice for posterior aspect of shoulder. He started session sitting EOB and ended session in semi haque position in bed.    Recommendation   Recommendations for Therapy Outpatient Therapy

## 2018-01-03 VITALS
BODY MASS INDEX: 25.16 KG/M2 | SYSTOLIC BLOOD PRESSURE: 131 MMHG | TEMPERATURE: 97.9 F | DIASTOLIC BLOOD PRESSURE: 78 MMHG | HEIGHT: 73 IN | OXYGEN SATURATION: 94 % | WEIGHT: 189.82 LBS | HEART RATE: 91 BPM | RESPIRATION RATE: 20 BRPM

## 2018-01-03 PROCEDURE — 6360000200 HC RX 636 W HCPCS (ALT 250 FOR IP): Performed by: STUDENT IN AN ORGANIZED HEALTH CARE EDUCATION/TRAINING PROGRAM

## 2018-01-03 PROCEDURE — 6370000100 HC RX 637 (ALT 250 FOR IP): Performed by: STUDENT IN AN ORGANIZED HEALTH CARE EDUCATION/TRAINING PROGRAM

## 2018-01-03 PROCEDURE — 99238 HOSP IP/OBS DSCHRG MGMT 30/<: CPT | Mod: GC | Performed by: STUDENT IN AN ORGANIZED HEALTH CARE EDUCATION/TRAINING PROGRAM

## 2018-01-03 PROCEDURE — 2500000200 HC RX 250 WO HCPCS: Performed by: STUDENT IN AN ORGANIZED HEALTH CARE EDUCATION/TRAINING PROGRAM

## 2018-01-03 PROCEDURE — 2590000100 HC RX 259: Performed by: STUDENT IN AN ORGANIZED HEALTH CARE EDUCATION/TRAINING PROGRAM

## 2018-01-03 RX ORDER — CELECOXIB 200 MG/1
200 CAPSULE ORAL 2 TIMES DAILY
Qty: 60 CAPSULE | Refills: 0 | Status: CANCELLED | OUTPATIENT
Start: 2018-01-03 | End: 2018-02-02

## 2018-01-03 RX ORDER — GABAPENTIN 100 MG/1
200 CAPSULE ORAL 3 TIMES DAILY
Qty: 180 CAPSULE | Refills: 0 | Status: CANCELLED | OUTPATIENT
Start: 2018-01-03 | End: 2018-02-02

## 2018-01-03 RX ORDER — GABAPENTIN 100 MG/1
200 CAPSULE ORAL 3 TIMES DAILY
Qty: 180 CAPSULE | Refills: 0 | Status: SHIPPED | OUTPATIENT
Start: 2018-01-03 | End: 2018-01-26

## 2018-01-03 RX ORDER — OXYCODONE AND ACETAMINOPHEN 5; 325 MG/1; MG/1
1 TABLET ORAL EVERY 8 HOURS PRN
Qty: 60 TABLET | Refills: 0 | Status: SHIPPED | OUTPATIENT
Start: 2018-01-03 | End: 2018-01-13

## 2018-01-03 RX ADMIN — CELECOXIB 200 MG: 200 CAPSULE ORAL at 08:16

## 2018-01-03 RX ADMIN — OXYCODONE HYDROCHLORIDE 10 MG: 5 TABLET ORAL at 05:30

## 2018-01-03 RX ADMIN — FENTANYL CITRATE 25 MCG: 50 INJECTION INTRAMUSCULAR; INTRAVENOUS at 04:13

## 2018-01-03 RX ADMIN — OXYCODONE HYDROCHLORIDE 10 MG: 5 TABLET ORAL at 09:32

## 2018-01-03 RX ADMIN — OXYCODONE HYDROCHLORIDE 10 MG: 5 TABLET ORAL at 01:32

## 2018-01-03 RX ADMIN — LIDOCAINE 1 PATCH: 560 PATCH PERCUTANEOUS; TOPICAL; TRANSDERMAL at 08:16

## 2018-01-03 RX ADMIN — GABAPENTIN 200 MG: 100 CAPSULE ORAL at 08:15

## 2018-01-03 RX ADMIN — FENTANYL CITRATE 25 MCG: 50 INJECTION INTRAMUSCULAR; INTRAVENOUS at 08:03

## 2018-01-03 RX ADMIN — BACLOFEN 20 MG: 10 TABLET ORAL at 08:15

## 2018-01-03 RX ADMIN — DIPHENHYDRAMINE HCL 25 MG: 25 TABLET ORAL at 05:30

## 2018-01-03 RX ADMIN — DIPHENHYDRAMINE HCL 25 MG: 25 TABLET ORAL at 01:32

## 2018-01-03 RX ADMIN — FENTANYL CITRATE 25 MCG: 50 INJECTION INTRAMUSCULAR; INTRAVENOUS at 11:21

## 2018-01-03 RX ADMIN — DIPHENHYDRAMINE HCL 25 MG: 25 TABLET ORAL at 13:30

## 2018-01-03 RX ADMIN — DIPHENHYDRAMINE HCL 25 MG: 25 TABLET ORAL at 09:32

## 2018-01-03 RX ADMIN — GABAPENTIN 200 MG: 100 CAPSULE ORAL at 15:13

## 2018-01-03 RX ADMIN — PREDNISONE 40 MG: 20 TABLET ORAL at 08:16

## 2018-01-03 RX ADMIN — DOCUSATE SODIUM 100 MG: 100 CAPSULE, LIQUID FILLED ORAL at 08:16

## 2018-01-03 RX ADMIN — OXYCODONE HYDROCHLORIDE 10 MG: 5 TABLET ORAL at 13:30

## 2018-01-03 RX ADMIN — BACLOFEN 20 MG: 10 TABLET ORAL at 15:13

## 2018-01-03 NOTE — NURSING END OF SHIFT
Nursing End of Shift Summary    Goals:  Clinical Goals for the Shift: Patient's pain is controlled, psyche is managed.    Narrative Summary of Progress Towards Clinical Goals:  Patient's pain was better controlled with fentanyl and lidocaine patch ordered by Dr. Cortez. He only had one episode of sobbing over night in comparison to the 4 hours of inconsolability last evening.   Ortho to consult with patient today to decide next step of care. Pain is requiring IV medication at this time.    Barriers for Transfer or Discharge: yes  Patient could benefit from psych consult as well. Passing on to day nurse to inquire with FMR.     Pain control? Further testing to determine cause of pain.

## 2018-01-03 NOTE — PLAN OF CARE
Problem: Musculoskeletal - Adult  Goal: Return mobility to safest level of function  INTERVENTIONS:  1. Assess patient stability and activity tolerance for standing, transferring and ambulating w/ or w/o assistive devices  2. Assist with transfers and ambulation using safe practices  3. Ensure adequate protection for wounds/incisions during mobilization  4. Obtain PT/OT and other consults as needed  5. Apply Continuous Passive Motion per order to increase flexion toward goal  6. Instruct patient/family in ordered activity level   Outcome: Progressing   01/03/18 0206   Interventions Appropriate for this Patient   Return mobility to safest level of function Assess patient stability and activity tolerance for standing, transferring and ambulating with or without assistive devices;Assist with transfers and ambulation using safe practices;Ensure adequate protection for wounds/incisions during mobilization;Obtain PT/OT and other consults as needed;Instruct patient/family in ordered activity level;Apply Continuous Passive Motion per order to increase flexion toward goal     Goal: Maintain proper alignment of affected body part  INTERVENTIONS:  1. Support and protect limb and body alignment per provider's orders  2. Instruct and reinforce with patient and family use of appropriate assistive device and precautions (e.g. spinal or hip dislocation precautions)   Outcome: Progressing   01/03/18 0206   Interventions Appropriate for this Patient   Maintain proper alignment of affected body part Support and protect limb and body alignment per provider's orders;Instruct and reinforce with patient and family use of appropriate assistive device and precautions (e.g. spinal or hip dislocation precautions)     Goal: Return ADL status to a safe level of function  INTERVENTIONS:  1. Assess patient's ADL deficits and provide assistive devices as needed  2. Obtain PT/OT consults as needed  3. Assist and instruct patient to increase  activity and self care   Outcome: Progressing   01/01/18 2209   Interventions Appropriate for this Patient   Return activities of daily living status to a safe level of function Assess patient's activities of daily living deficits and provide assistive devices as needed;Obtain PT/OT consults as needed;Assist and instruct patient to increase activity and self care       Problem: Pain - Adult  Goal: Verbalizes/displays adequate comfort level or baseline comfort level  INTERVENTIONS:  1. Encourage patient to monitor pain and request interventions  2. Assess pain using the appropriate pain scale  3. Administer analgesics based on type and severity of pain and evaluate response  4. Educate/Implement non-pharmacological measures as appropriate and evaluate response  5. Consider cultural, developmental and social influences on pain and pain management  6. Notify Provider if interventions unsuccessful or patient reports new pain   Outcome: Progressing   01/01/18 0033   Interventions Appropriate for this Patient   Verbalizes/displays adequate comfort level or baseline comfort level Encourage patient to monitor pain and request interventions;Assess pain using the appropriate pain scale       Problem: Knowledge Deficit  Goal: Patient/family/caregiver demonstrates understanding of disease process, treatment plan, medications, and discharge instructions  INTERVENTIONS:   1. Complete learning assessment and assess knowledge base  2. Provide teaching at level of understanding   3. Provide teaching via preferred learning methods   Outcome: Progressing   01/01/18 0033   Interventions Appropriate for this Patient   Patient/family/caregiver demonstrates understanding of disease process, treatment plan, medications, and discharge instructions Complete learning assessment and assess knowledge base;Provide teaching at level of understanding       Problem: Pain  Goal: STG - Patient verbalizes a reduction in pain level  Pain 4/10 or less  prior to d/c   Outcome: Progressing    Goal: LTG - Patient will manage pain with the appropriate technique/Intervention  Demonstrate Ind with home exercises to decrease pain and improve mobility     Outcome: Progressing

## 2018-01-03 NOTE — INTERDISCIPLINARY/THERAPY
01/03/18 1410   Subjective Comments   Subjective Comments RN ok'd PT session. Pt was in family room finishing lunch. He stated he was likely going to be discharged today. Discussed continued gentle, pain free motion of neck and LUE. Pt demonstrated quick rotation of head bilaterally and rotation to at least 45 degrees bilaterally. He also demonstrated cervical extension and left shoulder flexion to at least 90 degrees. Discussed providing arm with soft touch stimulous to prevent noxious stimulous sensation. Cousin entered family room and pt asked to speak with him.

## 2018-01-03 NOTE — INTERDISCIPLINARY/THERAPY
Patient to DC today. CM spoke to patient regarding DC needs or concerns. Patient states he gave his cousin some money to run some errands and then the cousin will come back to the hospital to transport the patient home. Patient denies any concerns or needs for DC.

## 2018-01-03 NOTE — PROGRESS NOTES
SENIOR FMR RESIDENT CROSS-COVER NOTE:     This note is meant to accompany the progress note by Dr. Retana from today.    Patient was seen and examined. I agree with Dr. Retana evaluation, assessment, and plan as documented in their note. Patient's case was discussed with attending physician, Dr. Benton, at team rounds. Please see Dr. Retana note for further details regarding today's plan of care.    ZAIN LOPEZ MD  1/3/2018 12:29 PM

## 2018-01-03 NOTE — DISCHARGE SUMMARY
Inpatient Discharge Summary    BRIEF OVERVIEW  Admitting Provider: Aaron Benton MD  Discharge Provider: No att. providers found  Primary Care Physician at Discharge: No Pcp (Inactive) None     Admission Date: 12/31/2017     Discharge Date: 1/3/2018    Primary Discharge Diagnosis  Cervical radiculopathy due to intervertebral disc disorder    Secondary Discharge Diagnosis  Patient Active Problem List   Diagnosis   • Cervical radiculopathy due to intervertebral disc disorder       Code Status At Discharge  Prior    Active Issues Requiring Follow-up  Recommend neuropsych consult for undiagnosed mild autism spectrum disorder    Ongoing neck pain on the left with radiculopathy into the left arm    Outpatient Follow-Up  Future Appointments  Date Time Provider Department Center   1/26/2018 8:15 MARÍA Thomas,  [id number]       Referrals and Follow-ups to Schedule     Ambulatory referral to Neurology       Radicular pain    Ambulatory referral to Pain Medicine       Patient has irretriacable neck pain with radicular symptoms on the left.          Test Results Pending at Discharge  None    Hospital Course  Patient was initially admitted for irretractable neck pain on the left with radicular symptoms that radiated down into his left hand.  Patient did have an MRI which did show pathology at the C6-C7 nerve root.  This was performed at [facility].  Initially the patient was started on baclofen and narcotics for pain control.  However the patient continued to endorse pain despite the narcotic pain regiment and the patient was given increased narcotic pain control including Dilaudid, fentanyl, oxycodone, morphine, a Lidoderm patch, Celebrex, tylenol, and percocet.  However the patient continued to endorse severe pain and would be in tears at times secondary to the pain.  This was inconsistent with the patient's pathology and also the amount of pain medication given as the pain medication given should  have gave the patient some mild relief.  At times during the hospital visit I would find the patient unaware of me entering the room and find him comfortably resting, texting on his phone.  But when we would start discussing how his pain was the pain would then reaccur and the patient would endorse pain in a similar type manifestation, however there were some variabilities in the pain distribution each time we discussed how the patient's pain was doing.      I contacted the patient's mother personally and spoke with her about the patient's pain.  We were unable to find an inciting event, and when inquiring about any neuropsych eval ever being performed the mother requested that we do not perform any psychiatric eval on the patient.  The patient had requested I also contact his brother and inquiring about any neuropsychiatric eval of the patient, the brother was also reluctant that we pursue any evaluation.  Patient's brother informed me of the fact that he is also recording our conversation at the end of our conversation. It was then expressed to him that this was a violation in HIPPA as the patient's medical records are protected.  During the course of my conversation with his brother however his brother was in agreement with our plan to refer the patient to pain management for further evaluation and possible treatment as well as neurology for any neuropathic pathology that might exist.  He was also in agreement with our discharge medication of Percocet and gabapentin.  He was also in agreement with physical therapy as an outpatient.  It was explained to the patient's brother my concerns about an underlying psychiatric disorder as I do not believe the patient is lying about his pain but I am concerned about oversensitivity to his pain as you would see in autism spectrum disorder.  Patient's brother discounted this as he did not believe his brother was suffering from any form of autism spectrum disorder.  Patient's  brother requested that I prescribed the patient a narcotic as well as benzodiazepine.  I advised the patient's brother I did not feel comfortable with this as there are black box warnings associated with prescribing these medications through the [facility].    Today when I went and rounded on the patient for second time today I found the patient upright and conversing with the nursing staff at the nursing station.  He was walking around and interacting without any difficulty and did not show any signs of any pain.  He was moving his arms appropriately and moving his neck without difficulty.  Furthermore he was conversing and walking around as if no pathology existed and as if the patient was in no pain.  This was peculiar to me as this morning the patient did endorse pain but requested he would like to be discharged home.  Patient was limited in movement this morning and continued to endorse pain that radiated down his left arm and could barely turn his neck. However this was an extreme contrast when I saw him this afternoon talking on the floor with the nurses. Once again when we went back to the patient's room and discussed his pain he began to have pain symptoms in his neck and his left arm and began to manifest as they were previously.  This was perplexing as couple minutes prior the patient was ambulating, conversing, and appeared to not be in any pain, especially to the magnitude he has been describing during his hospital course.    At the time of discharge the patient was stable and was in agreement with her current plan.  He did question why we are giving him Percocet instead of just oxycodone directly.  It was discussed with him that he is essentially taking the oxycodone component but has Tylenol in addition to the pain regiment as it helps in his overall pain control.  Patient does not have any allergies to acetaminophen, and has had no difficulty taking it during his hospital stay.  Again this added to the  complexity of the patient's situation and overall presentation.  While I do not believe the patient has any secondary gain in all this, it does make me wonder if there is an underlining secondary gain in the patient's presentation and even hospital course.      Recommend the patient find a PCP for further continuity of care.    Inpatient Pain/ Muscle Relaxents Medications Given During Admission  Baclofen 20 mg TID: 10 doses given  Celebrex 200 mg BID: 3 doses given  Gabapentin 100 mg TID: 4 doses  Gabapentin 200 mg TID: 4 doses   Hydromorphone 1 m dose  Lidocaine Patch: 3 patches applied   Morphine 6 m dose  Fentanyl 25 mcg every 2 hours PRN pain: 4 doses   Ketoralac 30 m   Morphine 4 mg every 3 hours PRN for pain: 8 doses   Roxicodone 10 mg every 4 hours PRN for pain: 7 doses   Percocet 5-325 mg (two tabs) every 4 hours PRN pain: 9 doses       Changes to Home Medications  Patient instructed to continue gabapentin 200 mg 3 times daily  Patient instructed to take Percocet 1 tab every 8 hours as needed for pain  Patient instructed to discontinue cyclobenzaprine, diazepam, tramadol, and diclofenac    Medications at Discharge     Your medication list      START taking these medications      Instructions Last Dose Given Next Dose Due   gabapentin 100 mg capsule  Commonly known as:  NEURONTIN      Take 2 capsules (200 mg total) by mouth 3 (three) times a day.          CHANGE how you take these medications      Instructions Last Dose Given Next Dose Due   oxyCODONE-acetaminophen 5-325 mg per tablet  Commonly known as:  PERCOCET  What changed:  when to take this      Take 1 tablet by mouth every 8 (eight) hours as needed for pain scale 4-7/10, 4-5/8 for up to 10 days Earliest Fill Date: 1/3/18.          STOP taking these medications    cyclobenzaprine 10 mg tablet  Commonly known as:  FLEXERIL        diazePAM 5 mg tablet  Commonly known as:  VALIUM        diclofenac 75 mg EC tablet  Commonly known as:   VOLTAREN        traMADol 50 mg tablet  Commonly known as:  ULTRAM 50              Where to Get Your Medications      These medications were sent to [id number] - [place], [place] - [address]  [address] [place] [address]    Phone:  [phone]   · gabapentin 100 mg capsule     You can get these medications from any pharmacy    Bring a paper prescription for each of these medications  · oxyCODONE-acetaminophen 5-325 mg per tablet         Physical Exam at Discharge  GENERAL: Patient is a 47 y.o. old male who appears their stated age, and in no distress  HEENT: PERRLA, EOMI, moist mucus membranes.   CARDIO: Normal S1,S2 heart sounds. RRR. No murmurs, rubs, or gallops.   RESP: Clear to auscultation bilaterally without crackles, wheezes, or rales.   GI: Normoactive bowel sounds. Abdomen soft, nontender, and nondistended. No organomegaly appreciated   NEURO: CNII-XII grossly intact. No neurological deficits appreciated.    MSK: No erythema noted, no bilateral pitting edema in lower limbs.  Full range of motion noted in upper and lower limbs without any pain or difficulty.  Patient was able to ambulate without difficulty or any signs of pain.  This changed when the patient was inquired about pain, he began to have pain in his left arm and hand and began having his neck slightly bent to the left due to pain.  DERM: No rashes noted.  PSYCH: Clinical interpretation of the patient's behavior with suggest mild autism spectrum disorder that is undiagnosed.  Patient has thought blocking, nonlinear thought process, no hallucinations, appropriate affect, and no depression.    Provider  Claude Retana, DO  7:55 PM, 1/3/2018.

## 2018-01-03 NOTE — INTERDISCIPLINARY/THERAPY
"CM visited with patient regarding CM role and possible needs or concerns for [facility]. Patient lives alone in [place]. Patient states he is independent, drives and works from home as a \"\". Patient states his only concern is that he does not always get his house clean and would like a list of maid services. CM googled maid services and came up with 4 companies ([employer], [employer], [employer], and [address] with phone numbers for patient. Patient admits that the problem with cleaning has to do with \"some psych issues\". Patient states [facility]. CM to follow progress and for needs.  "

## 2018-01-03 NOTE — PROGRESS NOTES
DAILY PROGRESS NOTE    Renaldo Salas is a 47 y.o. old male admitted on 12/31/2017.    Date of Service: 01/02/18    SUBJECTIVE   Patient seen and examined.  Patient overnight had severe pain. On exam it was difficult to ascertain the patients pain as the patient appeared to asymptomatic until inquired about his pain. It appears the patient does have pain, but the quality of pain is difficult to ascertain as the pain increases with discussion of the pain. I do not believe the patient has a secondary gain in appearing in more pain, but do believe the underlying undiagnosed neurodevelopmental delay/ psych disorder exemplifies the pain.     Richard Moya PA-C with [facility] was called today as the patient did have a appointment with him today. It was his impression that as the MRI did show some mild pathology the patient should not be experiencing this level of pain with this specific pathology. Furthermore he did not feel the patient would be a good surgical candidate. His recommendations at this time is pain management.     Difficult to ascertain based on no medical history, but does appear to patient does suffer from a developmental delay.  Thus review of systems was limited and difficult to ascertain.  See HPI for pertinent positive.      Past Medical History     Past Medical History:   Diagnosis Date   • Chronic pain     NECK   • Injury of back    • Peripheral neuropathy (CMS/HCC)        OBJECTIVE     Current Vital Signs:  Vitals:    01/02/18 1400   BP: 146/80   Pulse: 73   Resp: 18   Temp: 36.6 °C (97.9 °F)   SpO2: 96%       Intake and Output Last 24 Hours    Intake/Output Summary (Last 24 hours) at 01/02/18 1929  Last data filed at 01/02/18 1400   Gross per 24 hour   Intake             1380 ml   Output                0 ml   Net             1380 ml          Exam     GENERAL: Patient is a 47 y.o. old male who appears their stated age, and in no distress  HEENT: EOMI, moist mucus membranes.    CARDIO: Normal S1,S2 heart sounds. RRR. No murmurs, rubs, or gallops.   RESP: Clear to auscultation bilaterally without crackles, wheezes, or rales.   GI: Normoactive bowel sounds. Abdomen soft, nontender, and nondistended. No organomegaly appreciated   NEURO: CNII-XII grossly intact. No neurological deficits appreciated.    MSK: 5 out of 5 muscle strength upper and lower extremities.  5 out of 5 muscle strength in the neck musculature, with full range of motion noted on exam.  Pain to palpation on the left shoulder, with pain elicited with abduction above 90°.   DERM: No rashes noted.  PSYCH: No depression noted.  Suspicion of developmental delay.     Medications:   Scheduled Meds:    baclofen 20 mg oral 3x daily   celecoxib 200 mg oral 2x daily   docusate sodium 100 mg oral 2x daily   enoxaparin 40 mg subcutaneous Daily at 1400   gabapentin 200 mg oral 3x daily   predniSONE 40 mg oral Daily     PRN Meds:.•  acetaminophen  •  diphenhydrAMINE  •  magnesium hydroxide  •  morphine  •  ondansetron **OR** ondansetron  •  oxyCODONE    Diagnostic Data:      CBC:   No results found for: WBC, RBC, HGB, HCT, PLT  CMP:   Lab Results   Component Value Date     12/31/2017    K 3.7 12/31/2017     12/31/2017    CO2 29 12/31/2017    GLUCOSE 82 12/31/2017    CREATININE 0.8 12/31/2017    CALCIUM 8.6 12/31/2017    ALBUMIN 3.5 12/31/2017    ALKPHOS 43 (L) 12/31/2017    BILITOT 0.45 12/31/2017    ALT 61 (H) 12/31/2017    AST 31 12/31/2017    BUN 16 12/31/2017    ANIONGAP 6 12/31/2017     Coagulation:   No results found for: PT, INR, APTT  ABGs: No results found for: PHART, PHCAP, PHVEN, PO2, PO2ART, PO2CAP, DPB2DKC, JEE3PFF, PCO2, BASEEXCESS    Blood cultures: None  Urine cultures: None    EKG: None obtained.    ASSESSMENT     Patient Active Problem List   Diagnosis   • Cervical radiculopathy due to intervertebral disc disorder         PLAN       1.  Cervical radiculopathy and irretractable pain:   -Increase gabapentin  200 mg 3 times daily for neuropathic pain  -Begin Celebrex 200mg BID  -Continue prednisone 40 mg, day 3 of treatment  -Continue Tylenol as needed for pain every 4 hours with pain parameters  -Continue oxycodone 10mg every 4 hours as needed for pain with parameters.  -Continue morphine 3-4 mg IV every 3 hours as needed for pain with pain parameters  -Refer to pain management     2.  Change in cognitive behavior  -Head CT showed no abnormalities   -Consider this might be the patients baseline.       ADDITIONAL CARE NOTES     IVF: None  Diet: Regular  VTE Ppx:  Lovenox Sq  ABx: None  Electrolytes: Stable    CODE STATUS: Full Code     DISPO: Anticipate DC to home pending resolution of irretractable neck pain, likely 1-2 midnights.    Provider  Claude Retana, DO  7:29 PM, 1/2/2018.

## 2018-01-03 NOTE — PLAN OF CARE
Problem: Musculoskeletal - Adult  Goal: Return mobility to safest level of function  INTERVENTIONS:  1. Assess patient stability and activity tolerance for standing, transferring and ambulating w/ or w/o assistive devices  2. Assist with transfers and ambulation using safe practices  3. Ensure adequate protection for wounds/incisions during mobilization  4. Obtain PT/OT and other consults as needed  5. Apply Continuous Passive Motion per order to increase flexion toward goal  6. Instruct patient/family in ordered activity level   Outcome: Progressing   01/03/18 0206   Interventions Appropriate for this Patient   Return mobility to safest level of function Assess patient stability and activity tolerance for standing, transferring and ambulating with or without assistive devices;Assist with transfers and ambulation using safe practices;Ensure adequate protection for wounds/incisions during mobilization;Obtain PT/OT and other consults as needed;Instruct patient/family in ordered activity level;Apply Continuous Passive Motion per order to increase flexion toward goal     Goal: Maintain proper alignment of affected body part  INTERVENTIONS:  1. Support and protect limb and body alignment per provider's orders  2. Instruct and reinforce with patient and family use of appropriate assistive device and precautions (e.g. spinal or hip dislocation precautions)   Outcome: Progressing   01/03/18 0206   Interventions Appropriate for this Patient   Maintain proper alignment of affected body part Support and protect limb and body alignment per provider's orders;Instruct and reinforce with patient and family use of appropriate assistive device and precautions (e.g. spinal or hip dislocation precautions)     Goal: Return ADL status to a safe level of function  INTERVENTIONS:  1. Assess patient's ADL deficits and provide assistive devices as needed  2. Obtain PT/OT consults as needed  3. Assist and instruct patient to increase  activity and self care   Outcome: Progressing   01/01/18 2209   Interventions Appropriate for this Patient   Return activities of daily living status to a safe level of function Assess patient's activities of daily living deficits and provide assistive devices as needed;Obtain PT/OT consults as needed;Assist and instruct patient to increase activity and self care       Problem: Pain - Adult  Goal: Verbalizes/displays adequate comfort level or baseline comfort level  INTERVENTIONS:  1. Encourage patient to monitor pain and request interventions  2. Assess pain using the appropriate pain scale  3. Administer analgesics based on type and severity of pain and evaluate response  4. Educate/Implement non-pharmacological measures as appropriate and evaluate response  5. Consider cultural, developmental and social influences on pain and pain management  6. Notify Provider if interventions unsuccessful or patient reports new pain   Outcome: Progressing   01/01/18 0033   Interventions Appropriate for this Patient   Verbalizes/displays adequate comfort level or baseline comfort level Encourage patient to monitor pain and request interventions;Assess pain using the appropriate pain scale       Problem: Knowledge Deficit  Goal: Patient/family/caregiver demonstrates understanding of disease process, treatment plan, medications, and discharge instructions  INTERVENTIONS:   1. Complete learning assessment and assess knowledge base  2. Provide teaching at level of understanding   3. Provide teaching via preferred learning methods   Outcome: Progressing   01/01/18 0033   Interventions Appropriate for this Patient   Patient/family/caregiver demonstrates understanding of disease process, treatment plan, medications, and discharge instructions Complete learning assessment and assess knowledge base;Provide teaching at level of understanding

## 2018-01-08 ENCOUNTER — HOSPITAL ENCOUNTER (EMERGENCY)
Facility: HOSPITAL | Age: 48
Discharge: HOME | End: 2018-01-08
Attending: EMERGENCY MEDICINE
Payer: COMMERCIAL

## 2018-01-08 VITALS
OXYGEN SATURATION: 94 % | BODY MASS INDEX: 24.41 KG/M2 | WEIGHT: 185 LBS | SYSTOLIC BLOOD PRESSURE: 126 MMHG | RESPIRATION RATE: 18 BRPM | DIASTOLIC BLOOD PRESSURE: 62 MMHG | HEART RATE: 61 BPM | TEMPERATURE: 96.4 F

## 2018-01-08 DIAGNOSIS — M54.2 CHRONIC NECK PAIN: Primary | ICD-10-CM

## 2018-01-08 DIAGNOSIS — G89.29 CHRONIC NECK PAIN: Primary | ICD-10-CM

## 2018-01-08 DIAGNOSIS — M54.12 CERVICAL RADICULITIS: ICD-10-CM

## 2018-01-08 PROCEDURE — 6360000200 HC RX 636 W HCPCS (ALT 250 FOR IP): Performed by: EMERGENCY MEDICINE

## 2018-01-08 PROCEDURE — 99283 EMERGENCY DEPT VISIT LOW MDM: CPT | Performed by: EMERGENCY MEDICINE

## 2018-01-08 PROCEDURE — 96374 THER/PROPH/DIAG INJ IV PUSH: CPT

## 2018-01-08 RX ORDER — LORAZEPAM 2 MG/ML
1 INJECTION INTRAMUSCULAR ONCE
Status: COMPLETED | OUTPATIENT
Start: 2018-01-08 | End: 2018-01-08

## 2018-01-08 RX ADMIN — LORAZEPAM 1 MG: 2 INJECTION INTRAMUSCULAR; INTRAVENOUS at 06:13

## 2018-01-08 ASSESSMENT — ENCOUNTER SYMPTOMS
COUGH: 0
SORE THROAT: 0
DYSURIA: 0
NUMBNESS: 0
WEAKNESS: 0
DIARRHEA: 0
VOMITING: 0
HEADACHES: 0
FEVER: 0
NECK PAIN: 1
BACK PAIN: 0
EYE PAIN: 0

## 2018-01-08 NOTE — DISCHARGE INSTRUCTIONS
Return for weakness, numbness, cold, blue fingers, passing out, troubles breathing, rash or any other concerns.  Take medications only as prescribed.  Is important to follow-up with your family physician as planned.  Physical therapy can be helpful.  Is important that she follow-up with your discharge plan from your recent hospitalization which included a referral to neurology and pain management.  It is important to have a regular primary care provider as well.  Suggestions are listed above.

## 2018-01-08 NOTE — ED PROVIDER NOTES
"    HPI:  Chief Complaint   Patient presents with   • Shoulder Pain     PT C/O BACK PAIN THAT IS CAUSING SHOULDER PAIN. PT ALSO STATES HE HAS A \"NEW RED DOT\" TO THE L FOREARM AND THERE IS \"A LOT OF MUSCLE CRAMPING UNDER THERE\". PT STATES THIS IS AN ONGOING PROBLEM AND HE IS SUPPOSED TO FOLLOW UP WITH PCP TODAY AFTER AN MRI.        HPI  Patient is a 47-year-old male who presents with ongoing left neck and arm pain.  He has been seen previously in the emergency department on the 23rd, 24th, 25th.  He was admitted to this hospital from the 31st through the third.  He states he is taking his medications as prescribed but is not finding them helpful.  He woke up with acute exacerbation of his pain.  Patient states he feels like his left shoulder is \"stuck\".  There is been no trauma.  No fevers.  No numbness.  No weakness.  He denies any change in medications.  He states he has been compliant with his medications.  He denies any other recent illness.  No drug use.  No rash.  Patient is currently on oxycodone, tramadol, gabapentin, diclofenac.  He has these bottles with him.  There are several pills left in each bottle.  He denies anything makes it better or worse.  He states he was told that he has a problem with his C5.  He states they have talked about surgery but decided against it.  He states he had an MRI recently across the street.  Record shows a CT scan was performed on the first.       HISTORY:  Past Medical History:   Diagnosis Date   • Chronic pain     NECK   • Injury of back    • Peripheral neuropathy (CMS/HCC)        Past Surgical History:   Procedure Laterality Date   • APPENDECTOMY         Family History   Problem Relation Age of Onset   • Dysrhythmia Mother    • Alzheimer's disease Father        Social History   Substance Use Topics   • Smoking status: Never Smoker   • Smokeless tobacco: Never Used   • Alcohol use 0.6 oz/week     1 Cans of beer per week      Comment: 1 beer daily       ROS:  Review of " Systems   Constitutional: Negative for fever.   HENT: Negative for congestion, ear pain and sore throat.    Eyes: Negative for pain.   Respiratory: Negative for cough.    Cardiovascular: Negative for chest pain.   Gastrointestinal: Negative for diarrhea and vomiting.   Genitourinary: Negative for dysuria.   Musculoskeletal: Positive for neck pain. Negative for back pain.        Left shoulder pain, left arm pain   Skin: Negative.  Negative for rash.   Neurological: Negative for weakness, numbness and headaches.   All other systems reviewed and are negative.      PE:  ED Triage Vitals   Temp Heart Rate Resp BP SpO2   01/08/18 0548 01/08/18 0548 01/08/18 0548 01/08/18 0548 01/08/18 0548   (!) 35.8 °C (96.4 °F) 67 18 131/79 98 %      Temp Source Heart Rate Source Patient Position BP Location FiO2 (%)   01/08/18 0548 -- 01/08/18 0620 -- --   Axillary  Sitting         Physical Exam   Constitutional: He is oriented to person, place, and time. He appears well-developed and well-nourished.  Non-toxic appearance. No distress.   HENT:   Head: Normocephalic and atraumatic.   Mouth/Throat: Mucous membranes are normal.   Eyes: Conjunctivae and EOM are normal. Pupils are equal, round, and reactive to light.   Neck: Normal range of motion. Neck supple.   Nonmeningeal, sitting in bed looking at the floor,   Cardiovascular: Normal rate and regular rhythm.    No murmur heard.  Pulmonary/Chest: Effort normal and breath sounds normal. No stridor. No respiratory distress. He has no wheezes.   Musculoskeletal: Normal range of motion. He exhibits no edema, tenderness or deformity.        Left shoulder: He exhibits pain.   Distractible exam, normal range of motion, no evidence of dislocation, no bony tenderness, able to abduct and abduct, Median, radial, ulnar motor and sensory innervations are intact.  No vascular compromise.   Strong radial pulse.     Neurological: He is alert and oriented to person, place, and time.   Skin: Skin is warm  and dry. Capillary refill takes less than 2 seconds.   Psychiatric: He has a normal mood and affect. His behavior is normal. Judgment and thought content normal.   Nursing note and vitals reviewed.      ED LABS:  Labs Reviewed - No data to display      ED IMAGES:  No orders to display       ED PROCEDURES:  Procedures    ED COURSE:  ED Course        MDM:  MDM  47-year-old male who presents with ongoing chronic left shoulder and arm pain.  There is been no acute injury.  No change in symptoms.  No new symptoms.  No fever.  Nothing to suggest infection.  His exam is very variable and distractible.  It is difficult to examine the exact location of his pain.  Patient is on multiple medications already for this.  He states he had advanced imaging and an MRI recently.  Review of records show CT and plain films here in our system.  Will attempt to obtain MRI results.  Ativan given for muscle relaxation.  Patient is very anxious.  Poor historian.    Patient has been sleeping since receiving 1 mg of Ativan.  MRI results available at this time through an outside facility.  He has been completely relaxed here.  When he wakes up he seems to have no significant pain.  He falls back asleep easily.  After appropriate observation in the emergency department patient's pain has not returned.  He will be discharged home to continue follow-up with his primary care provider, pain referral and neurology appointments as recommended by his discharge provider on the third.  No changes in her home medications will be given.  All further pain management should come through a primary provider or pain management specialist.  Do not suspect acute deep space infection or spinal impingement.  Normal neurological exam.     Diagnosis, diagnostics, return precautions and follow-up plan have all been reviewed with patient at length.  He is discharged home.      Final diagnoses:   [M54.2, G89.29] Chronic neck pain   [M54.12] Cervical radiculitis       A  voice recognition program was used to aid in documentation of this record.  Sometimes words are not printed exactly as they were spoken.  While efforts were made to carefully edit and correct any inaccuracies, some areas may be present; please take these into context.  Please contact the provider if areas are identified.        Jo Vu MD  01/08/18 0768

## 2018-01-11 ENCOUNTER — DOCUMENTATION (OUTPATIENT)
Dept: FAMILY MEDICINE | Facility: CLINIC | Age: 48
End: 2018-01-11

## 2018-01-11 NOTE — PROGRESS NOTES
"Chart reviewed after discharge.  I received a notification that Renaldo had received a prescription that interacts with a prescription I prescribed at discharge.  I [place], and found significant drug abuse concerns.  On 12/25/17 he was seen in the ED at [facility] by Dr. Wiseman and was sent home with a ED dispensed prescription of Diazepam 5mg (1tab q6h PRN muscle spasm #30) and Percocet 5/325 (1tab Q4H PRN moderate Pain #30).  On 12/27/17 he was seen by Dr. Reddy and provided a prescription for Percocet 5/325 (sig unknown #40).  He as subsequently admitted after returning to the ED on 12/31/17.  During the hospitalization he had multiple actions that raised a concern for misuse of medications.  He was discharged with Percocet 5/325 (1tab Q8H PRN severe pain #60) which he filled on the date of discharge 3jan 2018.  This prescription at discharge was given after reviewing [place] and not seeing any significant aberrant behavior.  At discharge he was instructed to not take any more of the diazepam with the narcotic we were prescribing due to the \"Black Box Warning\" regarding concomitant use of Opoid's and Benzodiazepines.    At the time of review, the prescription from Dr. Reddy did not show up.  On 8jan2018 Renaldo was given a prescription for Clonazepam 0.5mg (1tab PO BID #28) by Whit Colindres who is an [facility] at [facility].      Due to his aberrant behavior that has become apparent, any controlled prescriptions for Renaldo should be given with caution.    SELENE DAVIS MD, 01/11/18 10:29 AM     "

## 2018-01-12 ENCOUNTER — HOSPITAL ENCOUNTER (EMERGENCY)
Facility: HOSPITAL | Age: 48
Discharge: HOME | End: 2018-01-12
Payer: COMMERCIAL

## 2018-01-12 VITALS
RESPIRATION RATE: 16 BRPM | TEMPERATURE: 98.2 F | DIASTOLIC BLOOD PRESSURE: 87 MMHG | SYSTOLIC BLOOD PRESSURE: 134 MMHG | WEIGHT: 189.6 LBS | HEART RATE: 80 BPM | BODY MASS INDEX: 25.02 KG/M2 | OXYGEN SATURATION: 94 %

## 2018-01-12 DIAGNOSIS — G89.29 CHRONIC BACK PAIN: Primary | ICD-10-CM

## 2018-01-12 DIAGNOSIS — M54.9 CHRONIC BACK PAIN: Primary | ICD-10-CM

## 2018-01-12 PROCEDURE — 99283 EMERGENCY DEPT VISIT LOW MDM: CPT

## 2018-01-12 RX ORDER — GABAPENTIN 100 MG/1
200 CAPSULE ORAL 3 TIMES DAILY
Qty: 42 CAPSULE | Refills: 0 | Status: SHIPPED | OUTPATIENT
Start: 2018-01-12 | End: 2018-01-26

## 2018-01-12 ASSESSMENT — ENCOUNTER SYMPTOMS
FEVER: 0
SORE THROAT: 0
HEADACHES: 0
ABDOMINAL DISTENTION: 0
DYSURIA: 0
SHORTNESS OF BREATH: 0
BACK PAIN: 1
NECK PAIN: 1

## 2018-01-13 ENCOUNTER — HOSPITAL ENCOUNTER (EMERGENCY)
Facility: HOSPITAL | Age: 48
Discharge: HOME | End: 2018-01-13
Attending: EMERGENCY MEDICINE
Payer: COMMERCIAL

## 2018-01-13 VITALS
SYSTOLIC BLOOD PRESSURE: 132 MMHG | RESPIRATION RATE: 16 BRPM | HEIGHT: 73 IN | HEART RATE: 92 BPM | TEMPERATURE: 97.5 F | OXYGEN SATURATION: 98 % | DIASTOLIC BLOOD PRESSURE: 86 MMHG | BODY MASS INDEX: 24.25 KG/M2 | WEIGHT: 182.98 LBS

## 2018-01-13 DIAGNOSIS — M54.12 CERVICAL RADICULOPATHY: Primary | ICD-10-CM

## 2018-01-13 DIAGNOSIS — M54.2 NECK PAIN ON LEFT SIDE: ICD-10-CM

## 2018-01-13 PROCEDURE — 6360000200 HC RX 636 W HCPCS (ALT 250 FOR IP): Performed by: PHYSICIAN ASSISTANT

## 2018-01-13 PROCEDURE — 99283 EMERGENCY DEPT VISIT LOW MDM: CPT

## 2018-01-13 PROCEDURE — 96372 THER/PROPH/DIAG INJ SC/IM: CPT

## 2018-01-13 PROCEDURE — 99283 EMERGENCY DEPT VISIT LOW MDM: CPT | Performed by: EMERGENCY MEDICINE

## 2018-01-13 RX ORDER — ORPHENADRINE CITRATE 100 MG/1
100 TABLET, EXTENDED RELEASE ORAL 2 TIMES DAILY
Qty: 20 TABLET | Refills: 0 | Status: SHIPPED | OUTPATIENT
Start: 2018-01-13 | End: 2018-01-26

## 2018-01-13 RX ORDER — ORPHENADRINE CITRATE 30 MG/ML
60 INJECTION INTRAMUSCULAR; INTRAVENOUS ONCE
Status: COMPLETED | OUTPATIENT
Start: 2018-01-13 | End: 2018-01-13

## 2018-01-13 RX ORDER — KETOROLAC TROMETHAMINE 30 MG/ML
30 INJECTION, SOLUTION INTRAMUSCULAR; INTRAVENOUS ONCE
Status: COMPLETED | OUTPATIENT
Start: 2018-01-13 | End: 2018-01-13

## 2018-01-13 RX ADMIN — ORPHENADRINE CITRATE 60 MG: 30 INJECTION INTRAMUSCULAR; INTRAVENOUS at 11:38

## 2018-01-13 RX ADMIN — KETOROLAC TROMETHAMINE 30 MG: 30 INJECTION, SOLUTION INTRAMUSCULAR at 11:38

## 2018-01-13 ASSESSMENT — ENCOUNTER SYMPTOMS
FEVER: 0
ARTHRALGIAS: 0
SORE THROAT: 0
NECK PAIN: 1
COLOR CHANGE: 0
CHILLS: 0
BACK PAIN: 0
SHORTNESS OF BREATH: 0
PALPITATIONS: 0
ABDOMINAL PAIN: 0
HEADACHES: 1
NAUSEA: 0
VOMITING: 0
COUGH: 0

## 2018-01-13 NOTE — DISCHARGE INSTRUCTIONS
Increase gabapentin to 300 mg 3 times daily.  Call Dr. Meade's office for refill and any additional medication.  Follow-up with your primary care in 1 week as needed.  Return to the emergency department for worsening or other concerning symptoms.

## 2018-01-13 NOTE — ED PROVIDER NOTES
Arm Pain (LEFT ARM PAIN - HAS BEEN SEEN HERE NUMEROUS TIMES FOR THE SAME ISSUE)        HPI:    Patient is a 47 y.o. male who presents with concern for left upper extremity and neck pain with no new accident or injury.  Patient states he has seen Dr. Meade and is scheduled for surgery on 2/7/2018 for anterior cervical discectomy and fusion.  Patient states he has been taking gabapentin and Percocet with limited relief.  Patient denies bowel or bladder incontinence.      Past Medical History:   Diagnosis Date   • Chronic pain     NECK   • Injury of back    • Peripheral neuropathy (CMS/HCC)        Past Surgical History:   Procedure Laterality Date   • APPENDECTOMY         Social History     Social History   • Marital status: Legally      Spouse name: N/A   • Number of children: N/A   • Years of education: N/A     Occupational History   • Not on file.     Social History Main Topics   • Smoking status: Never Smoker   • Smokeless tobacco: Never Used   • Alcohol use 0.6 oz/week     1 Cans of beer per week      Comment: 1 beer daily   • Drug use: No      Comment: Positive cannibus on UDS 12/31/17   • Sexual activity: Not on file     Other Topics Concern   • Not on file     Social History Narrative   • No narrative on file       Family History   Problem Relation Age of Onset   • Dysrhythmia Mother    • Alzheimer's disease Father        Allergies   Allergen Reactions   • Amoxicillin Itching         Current Outpatient Prescriptions:   •  gabapentin (NEURONTIN) 100 mg capsule, Take 2 capsules (200 mg total) by mouth 3 (three) times a day., Disp: 180 capsule, Rfl: 0  •  gabapentin (NEURONTIN) 100 mg capsule, Take 2 capsules (200 mg total) by mouth 3 (three) times a day., Disp: 42 capsule, Rfl: 0  •  orphenadrine (NORFLEX) 100 mg 12 hr tablet, Take 1 tablet (100 mg total) by mouth 2 (two) times a day for 10 days., Disp: 20 tablet, Rfl: 0    Review of Systems   Constitutional: Negative for chills and fever.   HENT:  Negative for ear pain and sore throat.    Respiratory: Negative for cough and shortness of breath.    Cardiovascular: Negative for chest pain and palpitations.   Gastrointestinal: Negative for abdominal pain, nausea and vomiting.   Genitourinary:        No incontinence of bowel or bladder   Musculoskeletal: Positive for neck pain. Negative for arthralgias and back pain.   Skin: Negative for color change and rash.   Neurological: Positive for headaches (Neck pain).   All other systems reviewed and are negative.      ED Triage Vitals [01/13/18 1015]   Temp Heart Rate Resp BP SpO2   36.4 °C (97.5 °F) 92 16 132/86 98 %      Temp Source Heart Rate Source Patient Position BP Location FiO2 (%)   Oral -- -- -- --         Physical Exam:  Nursing note and vitals reviewed.  Constitutional: appears well-developed.   Head: Normocephalic and atraumatic.   Eyes: Pupils are equal, round, and reactive to light.   Neck: Supple.  Cardiovascular: Regular rate and rhythm with no murmur, rub, or gallop.  Pulmonary/Chest: No respiratory distress.  Clear to auscultation bilaterally.  Abdominal: Soft and nontender.    Musculoskeletal: No edema.  Exam is pain limited patient with 4+/5 strength left upper extremity.  Neurological: Alert.   Skin: Skin is warm and dry. No rash noted.   Psychiatric: Normal mood and affect.           Labs:  Labs Reviewed - No data to display      Imaging:  No orders to display             MDM:    He is hemodynamically stable.  Patient presented with concern for continued neck pain and left arm pain for which she is seen Dr. Meade and has surgery scheduled for the first part of February.  Patient without new accident or injury.  Patient with pain limited exam but no bowel or bladder incontinence or focal weakness of left upper extremity.  Patient to continue gabapentin and pain medicine as prescribed.  Patient to call Dr. Meade's office on Monday for refill of gabapentin and other pain medications.  Patient  to return for worsening or other concerning symptoms.      Given   Medications   orphenadrine (NORFLEX) injection 60 mg (60 mg intramuscular Given 1/13/18 1138)   ketorolac (TORADOL) injection 30 mg (30 mg intramuscular Given 1/13/18 1138)       ED Course          Procedures        Clinical Impression:  Final diagnoses:   [M54.12] Cervical radiculopathy   [M54.2] Neck pain on left side           A voice recognition program was used to aid in documentation of this record.  Sometimes words are not printed exactly as they were spoken.  While efforts were made to carefully edit and correct any inaccuracies, some areas may be present; please take these into context.  Please contact the provider if areas are identified.           TERESA Bach  01/14/18 7315

## 2018-01-13 NOTE — DISCHARGE INSTRUCTIONS
Resume taking your gabapentin as prescribed  Contact the family medicine residency clinic for a follow-up appointment for pain management  Keep your scheduled appointment with your neurologist January 26 at 8:15 AM

## 2018-01-13 NOTE — ED PROVIDER NOTES
"    HPI:  Chief Complaint   Patient presents with   • Back Pain     Patient has complaints of cramping to his neck and back for \"months\". Reports no relief with cyclobenzaprine and oxycodone. Patient recently seen in ED for similar symptoms.       This is a 47-year-old male who presents with complaints of pain and cramping in the neck and upper back that have been bothering him for months now.  He has been seen in the ER multiple times and was admitted to the hospital at the end of last month and seen by the family medicine residency team.  At this time he tells me that he has not followed up with anyone and does not have a primary care provider.  He says that he seen Dr. Mcgee and has been given pain medication.  He is currently taking Flexeril and Percocet and is requesting something stronger for pain that will make him sleep.  He was given gabapentin while he was in the hospital but does not think that he has any more of those and is not taking them at this time.  He has some cramping that extends down the left arm.  He denies any loss of bladder or bowel control.  He denies any difficulty with ambulation.            HISTORY:  Past Medical History:   Diagnosis Date   • Chronic pain     NECK   • Injury of back    • Peripheral neuropathy (CMS/HCC)        Past Surgical History:   Procedure Laterality Date   • APPENDECTOMY         Family History   Problem Relation Age of Onset   • Dysrhythmia Mother    • Alzheimer's disease Father        Social History   Substance Use Topics   • Smoking status: Never Smoker   • Smokeless tobacco: Never Used   • Alcohol use 0.6 oz/week     1 Cans of beer per week      Comment: 1 beer daily       ROS:  Review of Systems   Constitutional: Negative for fever.   HENT: Negative for sore throat.    Eyes: Negative for visual disturbance.   Respiratory: Negative for shortness of breath.    Cardiovascular: Negative for chest pain.   Gastrointestinal: Negative for abdominal distention. "   Genitourinary: Negative for dysuria.   Musculoskeletal: Positive for back pain and neck pain.   Neurological: Negative for headaches.       PE:  ED Triage Vitals   Temp Heart Rate Resp BP SpO2   01/12/18 2250 01/12/18 2250 01/12/18 2250 01/12/18 2250 01/12/18 2250   36.8 °C (98.2 °F) 84 18 136/70 98 %      Temp Source Heart Rate Source Patient Position BP Location FiO2 (%)   01/12/18 2250 -- 01/12/18 2305 -- --   Oral  Sitting         Physical Exam   Constitutional: He is oriented to person, place, and time. He appears well-developed and well-nourished.   HENT:   Head: Normocephalic and atraumatic.   Eyes: EOM are normal.   Neck: Normal range of motion. Neck supple.   He is moving his neck constantly without any difficulty with range of motion   Cardiovascular: Normal rate and regular rhythm.    Pulmonary/Chest: Effort normal and breath sounds normal.   Musculoskeletal: Normal range of motion.   He has full range of motion of his arms bilaterally and does not have any weakness on exam.  He does have point tenderness to palpation medial to the left scapula   Neurological: He is alert and oriented to person, place, and time.   Muscle strength is normal and he is neurologically intact   Skin: Skin is warm.   Psychiatric:   Patient is pacing around the room and is agitated   Nursing note and vitals reviewed.      ED LABS:  Labs Reviewed - No data to display      ED IMAGES:  No orders to display       ED PROCEDURES:  Procedures    ED COURSE:  ED Course        MDM:  MDM  Number of Diagnoses or Management Options  Chronic back pain:   Diagnosis management comments: This is a 47-year-old male with a history of chronic neck and back pain.  He was recently hospitalized and cared for by the family medicine residency group who did set him up with a referral to a neurologist.  That appointment is later this month.  He is currently taking Percocet and Flexeril and would like something stronger.  In looking through his old  records it is documented that he has received multiple prescriptions from multiple providers in the last few weeks.  He has had multiple imaging studies recently including an MRI of his cervical spine and he seems unaware of what the follow-up plan is.  He has not followed up with the residency group at this time.  He is out of his gabapentin that he was started on while hospitalized.  I did refill his gabapentin which was 200 mg 3 times daily.  I have told him I cannot give him anything stronger for the pain and I cannot add sleeping pills to what he started taking.  I did recommend that he contact the family medicine residency group to help guide his further evaluation and management of his ongoing pain.  I also did remind him of his appointment with Dr. Thomas that his upcoming.  He was discharged home in the care of his brother with a prescription for gabapentin.      Final diagnoses:   [M54.9, G89.29] Chronic back pain        TERESA Bowman  01/12/18 2340

## 2018-01-15 ENCOUNTER — TELEPHONE (OUTPATIENT)
Dept: FAMILY MEDICINE | Facility: CLINIC | Age: 48
End: 2018-01-15

## 2018-01-15 NOTE — TELEPHONE ENCOUNTER
Patient is not our clinic patient. He has been following with [facility] [place] [facility]. I would have him continue to follow up with them, and if needed they can send a referral over for him.

## 2018-01-15 NOTE — TELEPHONE ENCOUNTER
Liliana called and this patient is a client of [facility] and would be quicker to have him seen there for an injection.

## 2018-01-16 ENCOUNTER — HOSPITAL ENCOUNTER (EMERGENCY)
Facility: HOSPITAL | Age: 48
Discharge: HOME | End: 2018-01-16
Payer: COMMERCIAL

## 2018-01-16 VITALS
HEART RATE: 88 BPM | RESPIRATION RATE: 18 BRPM | BODY MASS INDEX: 23.93 KG/M2 | HEIGHT: 73 IN | WEIGHT: 180.56 LBS | SYSTOLIC BLOOD PRESSURE: 140 MMHG | DIASTOLIC BLOOD PRESSURE: 92 MMHG | OXYGEN SATURATION: 97 % | TEMPERATURE: 97.7 F

## 2018-01-16 DIAGNOSIS — M54.2 CERVICALGIA: Primary | ICD-10-CM

## 2018-01-16 DIAGNOSIS — G89.29 OTHER CHRONIC PAIN: ICD-10-CM

## 2018-01-16 PROCEDURE — 99282 EMERGENCY DEPT VISIT SF MDM: CPT

## 2018-01-16 PROCEDURE — 6360000200 HC RX 636 W HCPCS (ALT 250 FOR IP): Performed by: PHYSICIAN ASSISTANT

## 2018-01-16 RX ORDER — KETOROLAC TROMETHAMINE 30 MG/ML
30 INJECTION, SOLUTION INTRAMUSCULAR; INTRAVENOUS ONCE
Status: COMPLETED | OUTPATIENT
Start: 2018-01-16 | End: 2018-01-16

## 2018-01-16 RX ADMIN — KETOROLAC TROMETHAMINE 30 MG: 30 INJECTION, SOLUTION INTRAMUSCULAR at 11:54

## 2018-01-16 ASSESSMENT — ENCOUNTER SYMPTOMS
WHEEZING: 0
ABDOMINAL PAIN: 0
PALPITATIONS: 0
COUGH: 0
STRIDOR: 0
SHORTNESS OF BREATH: 0
CHEST TIGHTNESS: 0

## 2018-01-16 NOTE — TELEPHONE ENCOUNTER
Liliana at [facility] was notified the [facility] has been seeing patient and is scheduled for surgery next month.

## 2018-01-16 NOTE — ED PROVIDER NOTES
HPI:  Chief Complaint   Patient presents with   • Shoulder Pain   • Back Pain       Renaldo is a 47-year-old male who presents for evaluation of an acute flare of his chronic neck and upper back pain.  He is due for surgery next week for a discectomy and cervical spine fusion.  He does have prescriptions for Percocet and muscle relaxers.  Additionally he reports he has a prescription for gabapentin.  He reports these medications do help.  However today he felt the pain was worse and decided to come in for evaluation ER.  He denies any new injuries or pain that is different from his typical chronic pain he has been having.  He has not attempted to follow-up with his orthopedic surgeon or his pain management providers.    No fever chills.  No weakness.  No headaches or vision change.  No swelling.  No abdominal pain.  No chest pain.  No shortness of breath or cough.  No GI or  complaints.            HISTORY:  Past Medical History:   Diagnosis Date   • Chronic pain     NECK   • Injury of back    • Peripheral neuropathy (CMS/HCC)        Past Surgical History:   Procedure Laterality Date   • APPENDECTOMY         Family History   Problem Relation Age of Onset   • Dysrhythmia Mother    • Alzheimer's disease Father        Social History   Substance Use Topics   • Smoking status: Never Smoker   • Smokeless tobacco: Never Used   • Alcohol use 0.6 oz/week     1 Cans of beer per week      Comment: 1 beer daily       ROS:  Review of Systems   Respiratory: Negative for cough, chest tightness, shortness of breath, wheezing and stridor.    Cardiovascular: Negative for chest pain, palpitations and leg swelling.   Gastrointestinal: Negative for abdominal pain.   All other systems reviewed and are negative.      PE:  ED Triage Vitals [01/16/18 1125]   Temp Heart Rate Resp BP SpO2   36.5 °C (97.7 °F) 104 20 149/81 97 %      Temp Source Heart Rate Source Patient Position BP Location FiO2 (%)   Oral -- -- -- --       Physical Exam    Constitutional: He is oriented to person, place, and time. Vital signs are normal. He appears well-developed and well-nourished. No distress.   HENT:   Head: Normocephalic and atraumatic.   Eyes: EOM are normal. Pupils are equal, round, and reactive to light. Right eye exhibits no discharge. Left eye exhibits no discharge.   Neck: Normal range of motion. Neck supple. No tracheal deviation present.   Cardiovascular: Normal rate, regular rhythm, normal heart sounds and intact distal pulses.    Pulmonary/Chest: Effort normal. No stridor. No respiratory distress.   Musculoskeletal:        Cervical back: He exhibits decreased range of motion, pain and spasm. He exhibits no tenderness (Subjective tenderness), no bony tenderness, no swelling and no edema.   Neurological: He is alert and oriented to person, place, and time. He has normal strength. No cranial nerve deficit or sensory deficit. He exhibits abnormal muscle tone (Neck is flexed to the left; he does have normal passive and active range of motion with coaching). He displays no seizure activity. GCS eye subscore is 4. GCS verbal subscore is 5. GCS motor subscore is 6.   Reflex Scores:       Patellar reflexes are 2+ on the right side and 2+ on the left side.  Skin: Capillary refill takes less than 2 seconds. He is not diaphoretic.   Psychiatric: He has a normal mood and affect. His behavior is normal. Judgment and thought content normal.       ED LABS:  Labs Reviewed - No data to display      ED IMAGES:  No orders to display       ED PROCEDURES:  Procedures    ED COURSE:  ED Course        MDM:  MDM  Number of Diagnoses or Management Options  Cervicalgia:   Other chronic pain:   Diagnosis management comments: Patient has no new injuries or symptoms compared to his chronic pain.  He does have prescriptions for pain medications at home and he has multiple follow-up options and I suggest he look into including contacting his orthopedic surgeon and/or pain management.   He is hemodynamically stable with no indications for new imaging or workup at this time.  I did provide a dose of Toradol for him and he is encouraged to avoid aggravating activities, rest, use as prescribed medications, and follow-up with his primary doctors.  Patient verbalizes understanding and agreement to the plan of care; he relates he will proceed up to [facility] spine today.      Final diagnoses:   [M54.2] Cervicalgia   [G89.29] Other chronic pain        TERESA Olivas  01/16/18 5064

## 2018-01-26 ENCOUNTER — CONSULT (OUTPATIENT)
Dept: NEUROLOGY | Facility: CLINIC | Age: 48
End: 2018-01-26
Payer: COMMERCIAL

## 2018-01-26 VITALS
HEIGHT: 73 IN | HEART RATE: 72 BPM | BODY MASS INDEX: 22.53 KG/M2 | RESPIRATION RATE: 18 BRPM | DIASTOLIC BLOOD PRESSURE: 70 MMHG | WEIGHT: 170 LBS | SYSTOLIC BLOOD PRESSURE: 110 MMHG

## 2018-01-26 DIAGNOSIS — M62.838 CERVICAL PARASPINAL MUSCLE SPASM: ICD-10-CM

## 2018-01-26 DIAGNOSIS — M54.10 RADICULOPATHY: ICD-10-CM

## 2018-01-26 DIAGNOSIS — M50.10 CERVICAL RADICULOPATHY DUE TO INTERVERTEBRAL DISC DISORDER: ICD-10-CM

## 2018-01-26 DIAGNOSIS — M54.10 RADICULOPATHY AFFECTING UPPER EXTREMITY: ICD-10-CM

## 2018-01-26 PROCEDURE — 99205 OFFICE O/P NEW HI 60 MIN: CPT | Performed by: PSYCHIATRY & NEUROLOGY

## 2018-01-26 RX ORDER — CYCLOBENZAPRINE HCL 10 MG
TABLET ORAL
Refills: 0 | COMMUNITY
Start: 2018-01-22

## 2018-01-26 RX ORDER — DIPHENHYDRAMINE HCL 25 MG
25 CAPSULE ORAL NIGHTLY PRN
COMMUNITY

## 2018-01-26 RX ORDER — OXYCODONE AND ACETAMINOPHEN 5; 325 MG/1; MG/1
TABLET ORAL
Refills: 0 | COMMUNITY
Start: 2018-01-18 | End: 2018-03-21 | Stop reason: ALTCHOICE

## 2018-01-26 RX ORDER — GABAPENTIN 300 MG/1
CAPSULE ORAL
Refills: 0 | COMMUNITY
Start: 2018-01-18 | End: 2018-02-27

## 2018-01-26 RX ORDER — GABAPENTIN 300 MG/1
600 CAPSULE ORAL 3 TIMES DAILY
Qty: 180 CAPSULE | Refills: 0 | Status: SHIPPED | OUTPATIENT
Start: 2018-01-26 | End: 2018-02-27

## 2018-01-26 ASSESSMENT — ENCOUNTER SYMPTOMS
CONSTITUTIONAL NEGATIVE: 1
BACK PAIN: 0
GASTROINTESTINAL NEGATIVE: 1
PSYCHIATRIC NEGATIVE: 1
JOINT SWELLING: 0
EYES NEGATIVE: 1
MYALGIAS: 1
NECK PAIN: 1
RESPIRATORY NEGATIVE: 1
NECK STIFFNESS: 1
NEUROLOGICAL NEGATIVE: 1
ARTHRALGIAS: 0
CARDIOVASCULAR NEGATIVE: 1

## 2018-01-26 ASSESSMENT — PAIN SCALES - GENERAL: PAINLEVEL: 4

## 2018-01-26 NOTE — PROGRESS NOTES
Neurology History and Physical    01/26/18  8:42 AM    Chief Complaint   Patient presents with   • Back Pain     cervical region       HPI  Renaldo Salas is a 47 y.o. male who presents today for the first time, following Cervical Spine surgery approximately 30 hours ago (1/24/2018).  It was done at [facility], and I do not have access to their notes to know the details of the procedure.      He said his problems began in the spring of 2017. He says sometimes he would sit at his desk and make a lot of conference calls.  He is a consultant/ (self study).  He would sit on his desk for hours on end, so not sure if he had an ergonomic-related injury.  He recalls no specific reason for the initiation of the problems.  He felt at first that he had someone driving their knuckles into this shoulder blade.  He tried massage, acupressure, swimming/aqua therapy, and he was able to relieve the pain.  Later, the pain returned in the fall of 2017, and continued to worsen.  He said the muscles in his shoulder blade felt terrible cramping, then pain began radiating to his chest.      He feels the pain is tremendously improved since the surgery, however he is still is describing pain in the elbow, tricep, medial forearm (along the ulna) and digits 1-2 paresthesias.  His digit three feels somewhat numb as well.  He is able to straighten his arm at the elbow, but cannot describe if he feels weak because he is so tired from the procedure and still medicated with postsurgical meds as well.       He did ask me if I am able to take off the C-spine neck brace and I indicated that I am not.  He also asked if he is able to do this and I said the only people who may remove this brace at any time or the surgeons who placed the collar.      Histories:    Medical:    Past Medical History:   Diagnosis Date   • Chronic pain     NECK   • Injury of back    • Peripheral neuropathy (CMS/HCC)          Surgical:    Past  Surgical History:   Procedure Laterality Date   • ANTERIOR CERVICAL DISCECTOMY W/ FUSION     • APPENDECTOMY           Family:    Family History   Problem Relation Age of Onset   • Dysrhythmia Mother    • Alzheimer's disease Father          Social:    Social History     Social History   • Marital status: Legally      Spouse name: N/A   • Number of children: N/A   • Years of education: N/A     Occupational History   • Not on file.     Social History Main Topics   • Smoking status: Never Smoker   • Smokeless tobacco: Never Used   • Alcohol use 0.6 oz/week     1 Cans of beer per week      Comment: 1 beer daily but has not had any since on meds   • Drug use: No      Comment: Positive cannibus on UDS 12/31/17   • Sexual activity: Defer     Other Topics Concern   • Not on file     Social History Narrative   • No narrative on file       Allergies:    Allergies   Allergen Reactions   • Amoxicillin Itching       Current Medications:    Current Outpatient Prescriptions   Medication Sig Dispense Refill   • diphenhydrAMINE (ALLERGY RELIEF,DIPHENHYDRAMIN,) 25 mg capsule Take 25 mg by mouth nightly as needed for itching or sleep.     • cyclobenzaprine (FLEXERIL) 10 mg tablet TK 1 T PO TID PRF SPASM  0   • gabapentin (NEURONTIN) 300 mg capsule TK 1 C PO TID  0   • oxyCODONE-acetaminophen (PERCOCET) 5-325 mg per tablet TK 1 T PO Q 4 TO 6 H PRN  0     No current facility-administered medications for this visit.        Home Medications:    Prior to Admission medications    Medication Sig Start Date End Date Taking? Authorizing Provider   diphenhydrAMINE (ALLERGY RELIEF,DIPHENHYDRAMIN,) 25 mg capsule Take 25 mg by mouth nightly as needed for itching or sleep.   Yes Historical Provider, MD   cyclobenzaprine (FLEXERIL) 10 mg tablet TK 1 T PO TID PRF SPASM 1/22/18   Historical Provider, MD   gabapentin (NEURONTIN) 300 mg capsule TK 1 C PO TID 1/18/18   Historical Provider, MD   oxyCODONE-acetaminophen (PERCOCET) 5-325 mg per  tablet TK 1 T PO Q 4 TO 6 H PRN 1/18/18   Historical Provider, MD   gabapentin (NEURONTIN) 100 mg capsule Take 2 capsules (200 mg total) by mouth 3 (three) times a day. 1/3/18 1/26/18  Claude Retana DO   gabapentin (NEURONTIN) 100 mg capsule Take 2 capsules (200 mg total) by mouth 3 (three) times a day. 1/12/18 1/26/18  TERESA Bowman   orphenadrine (NORFLEX) 100 mg 12 hr tablet Take 1 tablet (100 mg total) by mouth 2 (two) times a day for 10 days. 1/13/18 1/26/18  TERESA Bach       Review of Systems:  Review of Systems   Constitutional: Negative.    HENT: Negative.    Eyes: Negative.    Respiratory: Negative.    Cardiovascular: Negative.    Gastrointestinal: Negative.    Musculoskeletal: Positive for myalgias, neck pain and neck stiffness. Negative for arthralgias, back pain, gait problem and joint swelling.   Skin: Negative.    Neurological: Negative.    Psychiatric/Behavioral: Negative.    All other systems reviewed and are negative.      OBJECTIVE    Heart Rate:  [72] 72  Resp:  [18] 18  BP: (110)/(70) 110/70    Labs:    A1c: No results found for: HGBA1C  Basic:   Lab Results   Component Value Date     12/31/2017    K 3.7 12/31/2017     12/31/2017    CO2 29 12/31/2017    BUN 16 12/31/2017    CREATININE 0.8 12/31/2017    GLUCOSE 82 12/31/2017    CALCIUM 8.6 12/31/2017     CBC with Platelet:  No results found for: WBC, HGB, HCT, PLT, RBC, MCV, MCH, MCHC, RDW, MPV  Comp:   Lab Results   Component Value Date     12/31/2017    K 3.7 12/31/2017     12/31/2017    CO2 29 12/31/2017    BUN 16 12/31/2017    CREATININE 0.8 12/31/2017    GLUCOSE 82 12/31/2017    CALCIUM 8.6 12/31/2017    PROT 5.9 (L) 12/31/2017    ALBUMIN 3.5 12/31/2017    AST 31 12/31/2017    ALT 61 (H) 12/31/2017    ALKPHOS 43 (L) 12/31/2017    BILITOT 0.45 12/31/2017     Coags: No results found for: PT, APTT, INR  Lipid: No results found for: CHOL, TRIG, HDLC, LDLC, HDL, LDL  TSH: No results found for: TSH    Physical  Exam:    Physical Exam   Constitutional: He appears well-developed and well-nourished. He appears distressed.   HENT:   Head: Normocephalic and atraumatic.   Mouth/Throat: Oropharynx is clear and moist.   Eyes: Conjunctivae and EOM are normal. Pupils are equal, round, and reactive to light. No scleral icterus.   Neck: Normal range of motion. Neck supple.       Cardiovascular: Normal rate, regular rhythm, normal heart sounds and intact distal pulses.  Exam reveals no friction rub.    No murmur heard.  Pulmonary/Chest: Effort normal and breath sounds normal. No respiratory distress. He has no wheezes. He has no rales.   Abdominal: Soft. Bowel sounds are normal.   Musculoskeletal: Normal range of motion. He exhibits no edema, tenderness or deformity.   Neurological:   General: Lethargic, dysarthric speech, due to postsurgical medication.  Fully participates, engaged to the best of his ability.  MS:  LOC is alert, cognitive function grossly intact  CN:  EOMI, VFFTC, pupils symmetric, 2mm, reactive quite briskly to light.  Facial sensation intact and symmetric V1-V3 b/l.   Muscles facial expression symmetric.  Hearing intact.  Tongue and uvula midline, palate elevation symmetric.  No dysarthria.  No aphasia.  No hoarseness, swallow reflex intact.  Motor: Deltoid 5/5 and symmetric, limited only by pain  Elbow Flexion/Extension 5/5   Wrist Flexion/Extension 5/5 symmetric   strength 5/5 and symmetric  Hip, knee flexion/extension 5/5  Plantar-, dorsi - flexion 5/5  Sensory:  Hypaesthesia in the C7 dermatomal regions on the left to pinprick.  Hypoaesthesia to pinprick in the left digit three.  Otherwise, no sensory loss to light touch, pinprick, vibration in all remaining UE and LE Dermatomes.    Reflex:  More brisk throughout the left UE and Le:  Bicep 2/4 on right, 3/4 on left   Brachioradialis 2/4 on right, 2+/4 on left   Patellar 2/4 on right, 3/4 on left   Down going bilateral plantar response   Coordination:  Not  tested due to overall weakness from pain medication.    Gait:  Slow but symmetric, not wide-based, natural arm-swing, no foot drop, normal speed         Skin: Skin is warm and dry. Capillary refill takes less than 2 seconds. No rash noted. No erythema.   Psychiatric: He has a normal mood and affect. His behavior is normal. Judgment and thought content normal.   Vitals reviewed.      X-ray Cervical Spine 2 Or 3 Views    Result Date: 1/1/2018  Narrative: Exam: Cervical spine series 01/01/2018    Clinical history: Pain; radiculopathy; Procedure/views: Three views Comparison: 12/23/2017 Findings: Reversal of the usual cervical lordosis again noted with no evidence for subluxation. Degenerative narrowing of C5-C6 anterior disc space appearing similar anterior osteophyte formation. Minimal degenerative narrowing of C6-C7 anterior disc space is unchanged. No evidence for acute fracture. Soft tissues are unremarkable. No bony destructive process. No other acute process.     Impression: Degenerative changes with reversal of the cervical lordosis. No change from 12/23/2017.    X-ray Shoulder Left 2+ Views    Result Date: 1/1/2018  Narrative: Exam: Left shoulder, 4 views 01/01/2018    Clinical History:  Pain; RADICULOPATHY;  Comparison: None available Findings: There is no evidence for fracture or dislocation. Bony structures appear normal. Joint spaces are unremarkable. No significant soft tissue abnormality noted.     Impression: Impression: Negative    Ct Head With And Without Iv Contrast    Result Date: 1/1/2018  Narrative: Exam: CT of the head with and without contrast from 01/01/2018 1800 hours. Clinical History: Confusion/delirium, altered level of consciousness, unexplained. Comparison(s): None Technique: Before and after the intravenous administration of 100 cc of Isovue-370 (contrast waste equals 0 cc's), helical axial imaging was performed through the head. Five millimeter axial reconstructions were constructed in  "the orbital-meatal plane and reviewed. Dose reduction technique utilized; automatic/anatomic modulation of X-ray tube current (Auto mA). Findings: There are no areas of abnormal density. There is no intracranial hemorrhage or hydrocephalus. There are no masses or subdural fluid collections. The CSF-containing spaces are normal in size, shape, and position for age. There is no evidence of acute or chronic cortical infarct. There are no areas of abnormal enhancement. Small air-fluid level is present within the left maxillary sinus which could represent changes of acute sinusitis. Otherwise paranasal sinuses and remainder of the skull is unremarkable.     Impression:  IMPRESSION: 1. Normal pre and postcontrast imaging of the brain. 2. Small air-fluid level left maxillary sinus may represent acute sinusitis.      Assessment:    Patient is 47-year-old male who presents for the first time to my clinic approximately 30 hours after left C7 surgical procedure following severe left cervical radiculopathy in this region with accompanied muscle spasms.    The onset of his symptoms began in spring 2017 gradually worsening to the point of acute symptomology requiring surgery at the end of 2017.  An MRI of the cervical spine was performed prior to surgery that showed complete joint effacement at the uncovertebral joint of C7, in which surgery is the only remedy.  His surgeon was Dr. Meade at [facility].    He is currently taking gabapentin, 300 mg 3 times daily.  We discussed her gabapentin is a neuropathic pain medication and may help with the hypoesthesia he is experiencing in the C7 dermatomes and myotomes at this time.  I do find hypoesthesia on exam today, indicating that this particular nerve root is \"waking up \"following his surgery.  I think he could benefit from a higher dose of gabapentin.  600 mg 3 times a day is what I recommend, and I have written a prescription today for this.    I would like " to see him back in 2 months to see how his neurologic exam improves.  I have indicated that he may call at anytime with questions or changes in his neurological exam of concern.    I do not see a reason for any further imaging at this point.  I also explained that an EMG is typically done prior to surgery to indicate the severity of a nerve impingement or lesion.  Now that the surgery has been completed EMG is of limited to no utility unless there are postsurgical complications.    1. Radiculopathy affecting upper extremity    - Ambulatory referral to Neurology  - gabapentin (NEURONTIN) 300 mg capsule; Take 2 capsules (600 mg total) by mouth 3 (three) times a day.  Dispense: 180 capsule; Refill: 0    2. Cervical radiculopathy due to intervertebral disc disorder    - Ambulatory referral to Neurology  - gabapentin (NEURONTIN) 300 mg capsule; Take 2 capsules (600 mg total) by mouth 3 (three) times a day.  Dispense: 180 capsule; Refill: 0    3. Cervical paraspinal muscle spasm    - Ambulatory referral to Neurology  - gabapentin (NEURONTIN) 300 mg capsule; Take 2 capsules (600 mg total) by mouth 3 (three) times a day.  Dispense: 180 capsule; Refill: 0    4. Radiculopathy  - Ambulatory referral to Neurology  - gabapentin (NEURONTIN) 300 mg capsule; Take 2 capsules (600 mg total) by mouth 3 (three) times a day.  Dispense: 180 capsule; Refill: 0    Problem List Items Addressed This Visit        Nervous    Cervical radiculopathy due to intervertebral disc disorder      Other Visit Diagnoses     Radiculopathy affecting upper extremity        Cervical paraspinal muscle spasm        Radiculopathy                    Plan:   No orders of the defined types were placed in this encounter.      Assessment/Plan     Patient Instructions    Mikala Thomas DO

## 2018-01-26 NOTE — PATIENT INSTRUCTIONS
Initial visit with Dr. Thomas, Neurologist.    1.  Prescription written for gabapentin, 600 mg three times a day.  You may want to alternate dosing the gabapentin with cyclobenzaprine, each offset by 4 hours.      2.  Call office if you have worsening symptoms of pain, weakness, or disturbing sensation in the left arm.    Otherwise, return to clinic in two months and I will re-examine and repeat your neurologic exam.

## 2018-02-27 DIAGNOSIS — G62.9 NEUROPATHY: Primary | ICD-10-CM

## 2018-02-27 RX ORDER — GABAPENTIN 300 MG/1
CAPSULE ORAL
Qty: 180 CAPSULE | Refills: 1 | Status: SHIPPED | OUTPATIENT
Start: 2018-02-27 | End: 2018-04-18 | Stop reason: SDUPTHER

## 2018-03-20 NOTE — PROGRESS NOTES
Neurology Progress Note    03/19/18  8:47 PM    No chief complaint on file.      HPI  Renaldo Salas is a 47 y.o. male who returns as a follow-up for CC:  Of cervical spine problems at the uncovertebral joint at C6-C7,effacing the C7 nerve root.  He had surgery to release the nerve root, and was seen by myself just one day after completion of the nerve root release.  At that time, He felt the pain had tremendously improved since the surgery, however he was still describing pain in the elbow, tricep, medial forearm (along the ulna) and digits 1-2 paresthesias.  His digit three feels somewhat numb as well.  at his last office visit with me, he was able to straighten his arm at the elbow.       He says today that his mental acuity is not as sharp as it was before.  His left fingers are still numb, index finger and thumb.  Also has constant pain in tricep area.  Three vertebra fused in his neck.  He has neck pain that he thinks is post op pain.  He does have same symptoms as before the surgery, just not as severe.  He describes shoulder pain before the surgery.  This has eventually gone away.    He is still taking gabapentin 3600 mg daily, 1200 mg TID.      He also has left chest pain in the muscle, that began before the surgery.            Histories:chronic neck pain, back injury, peripheral neuropathy    Meds:  Gabapentin, cyclobenzaprine, oxycodone    Studies: CT head 1/2018:  Normal study    Labs:  Unremarkable    Consider EMG.  Do good exam.        Histories:    Medical:    Past Medical History:   Diagnosis Date   • Chronic pain     NECK   • Injury of back    • Peripheral neuropathy (CMS/HCC)          Family:    Family History   Problem Relation Age of Onset   • Dysrhythmia Mother    • Alzheimer's disease Father          Social:    Social History     Social History   • Marital status: Legally      Spouse name: N/A   • Number of children: N/A   • Years of education: N/A     Occupational History   • Not on  file.     Social History Main Topics   • Smoking status: Never Smoker   • Smokeless tobacco: Never Used   • Alcohol use 0.6 oz/week     1 Cans of beer per week      Comment: 1 beer daily but has not had any since on meds   • Drug use: No      Comment: Positive cannibus on UDS 12/31/17   • Sexual activity: Defer     Other Topics Concern   • Not on file     Social History Narrative   • No narrative on file       Allergies:    Allergies   Allergen Reactions   • Amoxicillin Itching       Current Medications:    Current Outpatient Prescriptions   Medication Sig Dispense Refill   • cyclobenzaprine (FLEXERIL) 10 mg tablet TK 1 T PO TID PRF SPASM  0   • diphenhydrAMINE (ALLERGY RELIEF,DIPHENHYDRAMIN,) 25 mg capsule Take 25 mg by mouth nightly as needed for itching or sleep.     • gabapentin (NEURONTIN) 300 mg capsule TAKE TWO CAPSULES BY MOUTH THREE TIMES DAILY 180 capsule 1   • oxyCODONE-acetaminophen (PERCOCET) 5-325 mg per tablet TK 1 T PO Q 4 TO 6 H PRN  0     No current facility-administered medications for this visit.        Home Medications:    Prior to Admission medications    Medication Sig Start Date End Date Taking? Authorizing Provider   cyclobenzaprine (FLEXERIL) 10 mg tablet TK 1 T PO TID PRF SPASM 1/22/18   Historical Provider, MD   diphenhydrAMINE (ALLERGY RELIEF,DIPHENHYDRAMIN,) 25 mg capsule Take 25 mg by mouth nightly as needed for itching or sleep.    Historical Provider, MD   gabapentin (NEURONTIN) 300 mg capsule TAKE TWO CAPSULES BY MOUTH THREE TIMES DAILY 2/27/18   Mikala Thomas,    oxyCODONE-acetaminophen (PERCOCET) 5-325 mg per tablet TK 1 T PO Q 4 TO 6 H PRN 1/18/18   Historical Provider, MD       Review of Systems:  Review of Systems   Constitutional: Negative.    HENT: Negative.    Musculoskeletal: Positive for myalgias, neck pain and neck stiffness.   Skin: Negative.    Neurological: Positive for numbness. Negative for dizziness, tremors, seizures, syncope, facial asymmetry, speech  difficulty, weakness, light-headedness and headaches.   Psychiatric/Behavioral: Negative.        OBJECTIVE         Labs:    A1c: No results found for: HGBA1C  No results found for: WBC, HGB, HCT, PLT, RBC, MCV, MCH, MCHC, RDW, MPV  Comp:   Lab Results   Component Value Date     12/31/2017    K 3.7 12/31/2017     12/31/2017    CO2 29 12/31/2017    BUN 16 12/31/2017    CREATININE 0.8 12/31/2017    GLUCOSE 82 12/31/2017    CALCIUM 8.6 12/31/2017    PROT 5.9 (L) 12/31/2017    ALBUMIN 3.5 12/31/2017    AST 31 12/31/2017    ALT 61 (H) 12/31/2017    ALKPHOS 43 (L) 12/31/2017    BILITOT 0.45 12/31/2017     Coags: No results found for: PT, APTT, INR  Lipid: No results found for: CHOL, TRIG, HDLC, LDLC, HDL, LDL    Physical Exam:    Physical Exam   Constitutional: He appears well-developed and well-nourished. He appears distressed.   HENT:   Head: Normocephalic and atraumatic.   Mouth/Throat: Oropharynx is clear and moist.   Eyes: Conjunctivae and EOM are normal. Pupils are equal, round, and reactive to light. No scleral icterus.   Neck: Normal range of motion. Neck supple.   Cardiovascular: Normal rate, regular rhythm, normal heart sounds and intact distal pulses.  Exam reveals no friction rub.    No murmur heard.  Pulmonary/Chest: Effort normal and breath sounds normal. No respiratory distress. He has no wheezes. He has no rales.   Abdominal: Soft. Bowel sounds are normal.   Musculoskeletal: Normal range of motion. He exhibits no edema, tenderness or deformity.   Neurological:   General:  Awake, fully participates, sitting upright, engaged.  MS:  LOC is alert, cognitive function grossly intact  CN:  EOMI, VFFTC, pupils symmetric, 2mm, reactive to light.  Facial sensation intact and symmetric V1-V3 b/l.   Muscles facial expression symmetric.  Hearing intact.  Tongue and uvula midline, palate elevation symmetric.  No dysarthria.  No aphasia.  No hoarseness, swallow reflex intact.  Motor: Deltoid 5/5  Elbow  Flexion/Extension 5/5   Wrist Flexion/Extension 5/5   strength 5/5  Hip, knee flexion/extension 5/5  Plantar-, dorsi - flexion 5/5  Sensory: Subjective alteration of sensation in the left arm, triceps region and forearm, however no objective findings to pinprick, vibration, light touch in the left upper extremity.     Reflex: Brisk hyper reflexes throughout, but no focality to the left upper extremity, such as C7 triceps reflex.    Bicep 3/4 bilateral   Brachioradialis 3/4 bilateral   Patellar 3/4 bilateral   Down going bilateral plantar response   Coordination:  Finger-to-nose and heel-to-shin accurate with no ataxia, no dysmetria.    Gait:  symmetric, not wide-based, natural arm-swing, no foot drop, normal speed         Skin: Skin is warm and dry. Capillary refill takes less than 2 seconds. No rash noted. No erythema.   Psychiatric: He has a normal mood and affect. His behavior is normal. Judgment and thought content normal.   Vitals reviewed.      No results found.    Assessment:    47-year-old male who returns today following cervical spine radiculopathy at C7 and urgent surgery for decompression.  At his first visit with me last time, he was 30 hours post surgery, with still wearing a neck brace, and was in a lot of pain that prevented good physical exam.  Today on exam he has subjective sensory alteration to the C7 dermatome, and less in the C5-C6 C8 dermatomes.  Objectively he has no asymmetry to light touch, pinprick, vibration; his strength in the C7 myotome is fully intact.  Reflexes are brisk throughout but no extra hyperreflexia in the C5-6 biceps tendon, C7 triceps tendon, or brachioradialis.    He is still taking 3600 mg daily of gabapentin.  I encouraged him to try to wean this down by cutting the dose by 25% 4 weeks to see how he does.  I also indicated that this is likely the period where the myelin is being reestablished and if there was any compressive axonal loss/conduction block, this is  also in the healing process at this time.  I expect this to improve.  He asked about a repeat MRI, but I said a more appropriate study would be an EMG at this point to determine if there is a myelin conduction block, axonal loss, etc.    I reassured him that his exam is very indicative of good outcome.  I fully expect full recovery from this, or may be just minor sensory alteration to the skin surface.    I will do an EMG, and have him come back to see our physicians assistant Skylar Carlson in 2 months.    1. Cervical radiculopathy due to intervertebral disc disorder    - EMG and NCV; Future      There are no diagnoses linked to this encounter.    Active Problems:  No Active Problems: There are no active problems currently on the Problem List. Please update the Problem List and refresh.        Plan:   No orders of the defined types were placed in this encounter.      Assessment/Plan     Patient Instructions    Followup visit with Dr. Thomas for C7 radiculpathy, s/p neurosurgical decompression.  :    1.  Physical exam is very encouraging, with intact reflexes, strength in C7 myotome, and intact sensation to vibration and pinprick.  Based on your perception of pain and sensory changes, we will do an EMG (electromyogram) to check the recovery of the myelin and axon at the neck.    Please schedule the EMG at checkout today.  We will call you with results of the EMG.      Return to clinic to see Brittany Carlson, our PA, in two months.      Mikala Thomas, DO

## 2018-03-21 ENCOUNTER — OFFICE VISIT (OUTPATIENT)
Dept: NEUROLOGY | Facility: CLINIC | Age: 48
End: 2018-03-21
Payer: COMMERCIAL

## 2018-03-21 VITALS — SYSTOLIC BLOOD PRESSURE: 112 MMHG | DIASTOLIC BLOOD PRESSURE: 80 MMHG | HEART RATE: 80 BPM | RESPIRATION RATE: 16 BRPM

## 2018-03-21 DIAGNOSIS — M50.10 CERVICAL RADICULOPATHY DUE TO INTERVERTEBRAL DISC DISORDER: Primary | ICD-10-CM

## 2018-03-21 PROCEDURE — 99214 OFFICE O/P EST MOD 30 MIN: CPT | Performed by: PSYCHIATRY & NEUROLOGY

## 2018-03-21 RX ORDER — OXYCODONE HYDROCHLORIDE 5 MG/1
5 TABLET ORAL 3 TIMES DAILY
Refills: 0 | COMMUNITY
Start: 2018-03-16

## 2018-03-21 ASSESSMENT — ENCOUNTER SYMPTOMS
NECK PAIN: 1
CONSTITUTIONAL NEGATIVE: 1
TREMORS: 0
SEIZURES: 0
NUMBNESS: 1
FACIAL ASYMMETRY: 0
SPEECH DIFFICULTY: 0
PSYCHIATRIC NEGATIVE: 1
NECK STIFFNESS: 1
DIZZINESS: 0
HEADACHES: 0
WEAKNESS: 0
LIGHT-HEADEDNESS: 0
MYALGIAS: 1

## 2018-03-21 ASSESSMENT — PAIN SCALES - GENERAL: PAINLEVEL: 4

## 2018-03-21 NOTE — PATIENT INSTRUCTIONS
Followup visit with Dr. Thomas for C7 radiculpathy:    1.  Physical exam is very encouraging, with intact reflexes, strength in C7 myotome, and intact sensation to vibration and pinprick.  Based on your perception of pain and sensory changes, we will do an EMG (electromyogram) to check the recovery of the myelin and axon at the neck.    Please schedule the EMG at checkout today.  We will call you with results of the EMG.      Return to clinic to see Brittany Carlson our PA, in two months.

## 2018-04-18 DIAGNOSIS — G62.9 NEUROPATHY: ICD-10-CM

## 2018-04-18 RX ORDER — GABAPENTIN 300 MG/1
CAPSULE ORAL
Qty: 180 CAPSULE | Refills: 2 | Status: SHIPPED | OUTPATIENT
Start: 2018-04-18 | End: 2018-05-15 | Stop reason: DRUGHIGH

## 2018-04-19 ENCOUNTER — ANCILLARY PROCEDURE (OUTPATIENT)
Dept: NEUROLOGY | Facility: CLINIC | Age: 48
End: 2018-04-19
Payer: COMMERCIAL

## 2018-04-19 DIAGNOSIS — M50.10 CERVICAL RADICULOPATHY DUE TO INTERVERTEBRAL DISC DISORDER: ICD-10-CM

## 2018-04-19 PROCEDURE — 95886 MUSC TEST DONE W/N TEST COMP: CPT | Performed by: PSYCHIATRY & NEUROLOGY

## 2018-04-19 PROCEDURE — 95909 NRV CNDJ TST 5-6 STUDIES: CPT | Performed by: PSYCHIATRY & NEUROLOGY

## 2018-05-14 ENCOUNTER — TELEPHONE (OUTPATIENT)
Dept: NEUROLOGY | Facility: CLINIC | Age: 48
End: 2018-05-14

## 2018-05-14 NOTE — TELEPHONE ENCOUNTER
"Patient called requesting the results of the EMG that was completed 04/19/2018. I notified him that I am unable to release the results as I have not received authorization from Dr. Thomas to do so. I apologized that he has not been given the results at this time and that I will let Dr. Thomas know so we can call him about this. He said Dr. Thomas told him that day that she would call him the next day with results. I explained that it takes several days for results to be available as she has to sit down and read the entire study and additionally she was out of the office for a week but I was very sorry he had not been contacted once she returned.     He became angry and began yelling saying he wants to speak with Dr. Thomas and I told him that at this time she is seeing patients. He started yelling louder saying \"well I am a patient too and I want to talk to her.\" I again said that Dr. Thomas is currently seeing patients within the clinic and that I will make sure to tell her that he called requesting results. He then said he wants to \"know what the plan is\" and \"what she is going to give me for pain because the other doctors will not give me any more pain medication.\" I explained that Dr. Thomas does not prescribe pain medication such as narcotics. I explained the medications she prescribes for the nerve pain are ones such as the gabapentin that she currently is prescribing. He continued yelling during the conversation and stated he wants to know what she plans on doing. I checked his upcoming appointments and explained that he is being seen next week with the nurse practitioner and that she will talk with him more about this as the EMG needed to be completed so a plan could be made. He yelled \"well she made me get that test and it hurt and she didn't even tell me and I'm a patient too\" and hung up the phone.   "

## 2018-05-15 DIAGNOSIS — M50.10 CERVICAL RADICULOPATHY DUE TO INTERVERTEBRAL DISC DISORDER: Primary | ICD-10-CM

## 2018-05-15 RX ORDER — GABAPENTIN 300 MG/1
900 CAPSULE ORAL 3 TIMES DAILY
Qty: 270 CAPSULE | Refills: 11 | Status: SHIPPED | OUTPATIENT
Start: 2018-05-15 | End: 2019-05-15

## 2018-05-15 NOTE — TELEPHONE ENCOUNTER
Toshia, I'm sorry you had to be treated so poorly by a patient.  I verbalized at the end of the procedure the diagnosis - and it is the same diagnosis that we have known for months now.  He has a C7 radiculopathy.  He has even had neurosurgery on his neck to decompress the C7 nerve root.  I do not see compression at any other level.  His results show that he is improving.  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    As far as his pain, if his pain is worse, I will increase the gabapentin to 900 mg three times daily.  At that point we are approaching the max dose of this medication (1200mg TID).    Again, I'm sorry he is treating you poorly.  I thought he understood that day when I verbally told him the results.aletha Shelby

## 2018-05-15 NOTE — TELEPHONE ENCOUNTER
Two attempts have been made today to call patient. One attempt was made with [facility] with a voice left and a second attempt was made with CELESTE Raymond without a message being left.

## 2018-05-16 ENCOUNTER — TELEPHONE (OUTPATIENT)
Dept: NEUROLOGY | Facility: CLINIC | Age: 48
End: 2018-05-16

## 2018-05-16 NOTE — TELEPHONE ENCOUNTER
"Patient returned my call regarding the EMG results he requested by beginning the call with an apologigy. He stated that he got \"heat stroke\" and that he does not normally scream at people over the phone.     I explained that Dr. Thomas stated that she gave him results of the EMG at the end of the study and that she found him to have the same diagnosis as previously indicated that he was well aware of with C7 radiculopathy. I explained that there is a different rearrangement of the nerve pathways in his forearm and that that will not cause symptoms. He questioned whether the pain will ever go away and I let him know that every person is different so there is no way to make that determination. He did state during this call that his pain is \"getting better\" and that he is currently only taking two of the 300 mg caps of gabapentin a day because he wants to \"wean off the medication\" because he recently heard that the medication is bad for you. He also stated that he was told by Dr. Thomas that he can take \"4 pills an hour.\" I told him that he needs to take the medication as prescribed.     I told patient at the end of the telephone call that at this time his follow up appointment with Skylar Carlson CNP is being cancelled and that he will be receiving a letter in the mail with more information regarding this. He stated, more to himself it sounded like, that he will need to find a new \"neurology.\" Then he told me to have a good day and \"don't get heat stroke\" and hung up the phone.    CELESTE Raymond and Mitch  were both present during this telephone call.   "

## 2018-05-16 NOTE — TELEPHONE ENCOUNTER
I called and was unable to reach patient. I left a voicemail requesting a return call regarding his upcoming appointment as well as the testing he had done with Dr. Thomas. CELESTE Raymond was present for the call.

## 2022-07-12 ENCOUNTER — APPOINTMENT (OUTPATIENT)
Dept: LAB | Facility: URGENT CARE | Age: 52
End: 2022-07-12

## 2022-07-12 PROCEDURE — 99000 SPECIMEN HANDLING OFFICE-LAB: CPT | Mod: ESCRN | Performed by: STUDENT IN AN ORGANIZED HEALTH CARE EDUCATION/TRAINING PROGRAM
